# Patient Record
Sex: FEMALE | Race: WHITE | Employment: UNEMPLOYED | URBAN - METROPOLITAN AREA
[De-identification: names, ages, dates, MRNs, and addresses within clinical notes are randomized per-mention and may not be internally consistent; named-entity substitution may affect disease eponyms.]

---

## 2020-09-24 LAB
IRON: 73
SARS-COV-2: NOT DETECTED
TOTAL IRON BINDING CAPACITY: NORMAL

## 2021-01-04 ENCOUNTER — TELEPHONE (OUTPATIENT)
Dept: PRIMARY CARE CLINIC | Age: 21
End: 2021-01-04

## 2021-01-04 ENCOUNTER — OFFICE VISIT (OUTPATIENT)
Dept: PRIMARY CARE CLINIC | Age: 21
End: 2021-01-04
Payer: COMMERCIAL

## 2021-01-04 VITALS
DIASTOLIC BLOOD PRESSURE: 78 MMHG | BODY MASS INDEX: 20.73 KG/M2 | HEIGHT: 70 IN | WEIGHT: 144.8 LBS | TEMPERATURE: 97.2 F | OXYGEN SATURATION: 97 % | SYSTOLIC BLOOD PRESSURE: 116 MMHG | HEART RATE: 55 BPM

## 2021-01-04 DIAGNOSIS — M79.675 PAIN IN TOES OF BOTH FEET: ICD-10-CM

## 2021-01-04 DIAGNOSIS — M54.9 UPPER BACK PAIN: ICD-10-CM

## 2021-01-04 DIAGNOSIS — G47.00 INSOMNIA, UNSPECIFIED TYPE: ICD-10-CM

## 2021-01-04 DIAGNOSIS — B35.1 ONYCHOMYCOSIS: ICD-10-CM

## 2021-01-04 DIAGNOSIS — L70.0 ACNE VULGARIS: ICD-10-CM

## 2021-01-04 DIAGNOSIS — M79.674 PAIN IN TOES OF BOTH FEET: ICD-10-CM

## 2021-01-04 DIAGNOSIS — F84.0 AUTISM SPECTRUM DISORDER: ICD-10-CM

## 2021-01-04 DIAGNOSIS — M25.552 CHRONIC LEFT HIP PAIN: ICD-10-CM

## 2021-01-04 DIAGNOSIS — L70.0 ACNE VULGARIS: Primary | ICD-10-CM

## 2021-01-04 DIAGNOSIS — G89.29 CHRONIC LEFT HIP PAIN: ICD-10-CM

## 2021-01-04 PROCEDURE — 99204 OFFICE O/P NEW MOD 45 MIN: CPT | Performed by: FAMILY MEDICINE

## 2021-01-04 RX ORDER — NAPROXEN 500 MG/1
500 TABLET ORAL 2 TIMES DAILY WITH MEALS
Qty: 30 TABLET | Refills: 0 | Status: SHIPPED | OUTPATIENT
Start: 2021-01-04 | End: 2021-07-15 | Stop reason: SDUPTHER

## 2021-01-04 RX ORDER — CYCLOBENZAPRINE HCL 5 MG
5 TABLET ORAL NIGHTLY PRN
Qty: 30 TABLET | Refills: 0 | Status: SHIPPED | OUTPATIENT
Start: 2021-01-04 | End: 2021-01-28

## 2021-01-04 RX ORDER — NORGESTIMATE AND ETHINYL ESTRADIOL 7DAYSX3 28
1 KIT ORAL DAILY
COMMUNITY
End: 2021-07-20 | Stop reason: SDUPTHER

## 2021-01-04 RX ORDER — ZOLPIDEM TARTRATE 10 MG/1
10 TABLET ORAL NIGHTLY PRN
Qty: 30 TABLET | Refills: 0 | Status: SHIPPED | OUTPATIENT
Start: 2021-01-04 | End: 2021-01-04 | Stop reason: SDUPTHER

## 2021-01-04 RX ORDER — CLINDAMYCIN AND BENZOYL PEROXIDE 10; 50 MG/G; MG/G
GEL TOPICAL
Qty: 35 G | Refills: 3 | Status: SHIPPED | OUTPATIENT
Start: 2021-01-04 | End: 2021-01-04 | Stop reason: SDUPTHER

## 2021-01-04 RX ORDER — CYCLOBENZAPRINE HCL 5 MG
5 TABLET ORAL NIGHTLY PRN
Qty: 30 TABLET | Refills: 0 | Status: SHIPPED | OUTPATIENT
Start: 2021-01-04 | End: 2021-01-04 | Stop reason: SDUPTHER

## 2021-01-04 RX ORDER — CLINDAMYCIN AND BENZOYL PEROXIDE 10; 50 MG/G; MG/G
GEL TOPICAL
Qty: 35 G | Refills: 3 | Status: ON HOLD | OUTPATIENT
Start: 2021-01-04 | End: 2021-05-07 | Stop reason: ALTCHOICE

## 2021-01-04 RX ORDER — NAPROXEN 500 MG/1
500 TABLET ORAL 2 TIMES DAILY WITH MEALS
Qty: 30 TABLET | Refills: 0 | Status: SHIPPED | OUTPATIENT
Start: 2021-01-04 | End: 2021-01-04 | Stop reason: SDUPTHER

## 2021-01-04 RX ORDER — ZOLPIDEM TARTRATE 10 MG/1
10 TABLET ORAL NIGHTLY PRN
Qty: 30 TABLET | Refills: 0 | Status: SHIPPED | OUTPATIENT
Start: 2021-01-04 | End: 2021-02-01 | Stop reason: SDUPTHER

## 2021-01-04 ASSESSMENT — ENCOUNTER SYMPTOMS
NAUSEA: 0
ROS SKIN COMMENTS: ACNE ON CHIN
SHORTNESS OF BREATH: 0
VOMITING: 0
BACK PAIN: 1

## 2021-01-04 ASSESSMENT — PATIENT HEALTH QUESTIONNAIRE - PHQ9
2. FEELING DOWN, DEPRESSED OR HOPELESS: 0
SUM OF ALL RESPONSES TO PHQ QUESTIONS 1-9: 0
SUM OF ALL RESPONSES TO PHQ9 QUESTIONS 1 & 2: 0

## 2021-01-04 NOTE — TELEPHONE ENCOUNTER
Mother called in stating that she went to [facility] and they will not fill the meds rx'ed today because they do not fill [facility] insurance   Pt mom would like meds sent to [facility].

## 2021-01-04 NOTE — PROGRESS NOTES
[facility] PRIMARY CARE  [address]   [address]  [address] [address]  Dept: [phone]  Dept Fax: [phone]    Elena Rodriguez is a 21 y.o. female who presents today for her medical conditions/complaints as noted below. Elena Rodriguez is c/o of  Chief Complaint   Patient presents with    Establish Care    Medication Refill    Other     toe pain from toenail removal         HPI:     HPI     Here to establish care with new pcp    [place] [facility] [place]. Has hx of autism spectrum . She takes trisprintec regulary, helps regulate her periods . Her periods were long and very heavy and painful previously before being on OCP. She notes some acne on her face more so lately, mainly on her chin. Uses a face wash , but not topical medication at this point yet. She had ingrown toenail removal done previously and notes some yellow color to the medial side of her left foot and also slight discoloration of medial part of her R big toe. Feels like they burn. No swelling or redness of toes noted. Pt also deals with insomnia regularly. She was prescribed ambien 5mg which did not help but 10 mg did help her sleep better. Pt also has hx of chronic left hip pain and also notes some upper back pain. States the upper back pain started a week ago and states she usually lays a certain way on her side and feels may have caused some of this pain. States she felt a cracking sound all of a sudden and has been hurting since. No results found for: LABA1C          ( goal A1C is < 7)   No results found for: LABMICR  No results found for: LDLCHOLESTEROL, LDLCALC    (goal LDL is <100)   No results found for: AST, ALT, BUN  BP Readings from Last 3 Encounters:   01/04/21 116/78          (goal 120/80)    Past Medical History:   Diagnosis Date    Autism       History reviewed. No pertinent surgical history.     Family History   Problem Relation Age of Onset    Eyes:      Conjunctiva/sclera: Conjunctivae normal.      Pupils: Pupils are equal, round, and reactive to light. Neck:      Musculoskeletal: Neck supple. Cardiovascular:      Rate and Rhythm: Normal rate and regular rhythm. Heart sounds: Normal heart sounds. Pulmonary:      Effort: Pulmonary effort is normal.      Breath sounds: Normal breath sounds. Musculoskeletal:      Comments: Pt denies any ttp paraspinal muscles of upper back, states is sore but does note feel worse on palpation. Skin:     General: Skin is warm and dry. Comments: Acne on chin noted  Some yellow discoloration of left big toe medially. Neurological:      Mental Status: She is alert and oriented to person, place, and time. Psychiatric:         Behavior: Behavior normal.         Thought Content: Thought content normal.       /78   Pulse 55   Temp 97.2 °F (36.2 °C)   Ht 5' 10\" (1.778 m)   Wt 144 lb 12.8 oz (65.7 kg)   SpO2 97%   BMI 20.78 kg/m²     Assessment:      1. Acne vulgaris  - recommend benzaclin, rx sent to pharmacy . 2. Pain in toes of both feet  - pt had recent ingrown toenails removed before moving here. - recommend topical antifungal cream , some yellow color to left big toe. - recommend seeing podiatry. - Dieudonne Serrano DPM, Podiatry, [place]    3. Onychomycosis  - [facility], Rissa Paredes, TRACI, [address] [place] [address]    4. Upper back pain  - recommend PT, pt tearful and wants to find out what is going on. I recommended we can get xray and start physical theraoy. Pt hesitant to take medication due to side effects but recommend trial of naproxen and flexeril for short term for her back pain. - XR THORACIC SPINE (3 VIEWS); Future  - [facility] Physical Therapy - [place]    5. Insomnia, unspecified type  - pt states ambien 10 mg was helpful, discussed considering trazodone in future as well instead. 6. Chronic left hip pain  - [facility] Physical Therapy - [place]    7. Autism spectrum disorder             Plan:      Return in about 3 months (around 4/4/2021) for follow up. Orders Placed This Encounter   Procedures    XR THORACIC SPINE (3 VIEWS)     Standing Status:   Future     Standing Expiration Date:   1/4/2022     Order Specific Question:   Reason for exam:     Answer:   upper back pain, flared up past week. [address] [facility] TRACI Huber, Podiatry, [facility]     Referral Priority:   Routine     Referral Type:   Eval and Treat     Referral Reason:   Specialty Services Required     Referred to Provider:   Selene Bowman DPM     Requested Specialty:   Podiatry     Number of Visits Requested:   1    [facility] Physical Therapy - [place]     Referral Priority:   Routine     Referral Type:   Eval and Treat     Referral Reason:   Specialty Services Required     Requested Specialty:   Physical Therapy     Number of Visits Requested:   1     Orders Placed This Encounter   Medications    DISCONTD: clindamycin-benzoyl peroxide (BENZACLIN) 1-5 % gel     Sig: Apply topically 2 times daily. Dispense:  35 g     Refill:  3    DISCONTD: zolpidem (AMBIEN) 10 MG tablet     Sig: Take 1 tablet by mouth nightly as needed for Sleep for up to 30 days. Dispense:  30 tablet     Refill:  0    DISCONTD: cyclobenzaprine (FLEXERIL) 5 MG tablet     Sig: Take 1 tablet by mouth nightly as needed for Muscle spasms     Dispense:  30 tablet     Refill:  0    DISCONTD: naproxen (NAPROSYN) 500 MG tablet     Sig: Take 1 tablet by mouth 2 times daily (with meals) As needed for pain     Dispense:  30 tablet     Refill:  0        Patient given educational materials - see patient instructions. Discussed use, benefit, and side effects of prescribed medications. All patient questions answered. Pt voiced understanding. Reviewed healthmaintenance. Instructed to continue current medications, diet and exercise. Patient agreed with treatment plan. Follow up as directed.      Electronically signed by Jayla Mendoza DO Robert on 1/4/2021 at 1:22 PM

## 2021-01-07 ENCOUNTER — OFFICE VISIT (OUTPATIENT)
Dept: PODIATRY | Age: 21
End: 2021-01-07
Payer: COMMERCIAL

## 2021-01-07 VITALS — HEIGHT: 70 IN | WEIGHT: 144 LBS | TEMPERATURE: 97.2 F | BODY MASS INDEX: 20.62 KG/M2

## 2021-01-07 DIAGNOSIS — B35.1 DERMATOPHYTOSIS OF NAIL: Primary | ICD-10-CM

## 2021-01-07 PROCEDURE — G8420 CALC BMI NORM PARAMETERS: HCPCS | Performed by: PODIATRIST

## 2021-01-07 PROCEDURE — 1036F TOBACCO NON-USER: CPT | Performed by: PODIATRIST

## 2021-01-07 PROCEDURE — G8427 DOCREV CUR MEDS BY ELIG CLIN: HCPCS | Performed by: PODIATRIST

## 2021-01-07 PROCEDURE — G8484 FLU IMMUNIZE NO ADMIN: HCPCS | Performed by: PODIATRIST

## 2021-01-07 PROCEDURE — 99203 OFFICE O/P NEW LOW 30 MIN: CPT | Performed by: PODIATRIST

## 2021-01-07 RX ORDER — TERBINAFINE HYDROCHLORIDE 250 MG/1
250 TABLET ORAL DAILY
Qty: 30 TABLET | Refills: 0 | Status: SHIPPED | OUTPATIENT
Start: 2021-01-07 | End: 2021-02-04 | Stop reason: SDUPTHER

## 2021-01-07 NOTE — PROGRESS NOTES
[address] [facility] [address] [address] [facility] [address].  Dept: [phone]    NEW PATIENT PROGRESS NOTE  Date of patient's visit: 1/7/2021  Patient's Name:  Shelbi Escobar YOB: 2000            Patient Care Team:  Marisela Orozco DO as PCP - General (Family Medicine)  Marisela Orozco DO as PCP - [facility] Empaneled Provider        Chief Complaint   Patient presents with    New Patient    Nail Problem     fungus         HPI:   Shelbi Escobar is a 21 y.o. female who presents to the office today complaining of  Possible toenail fungus. Symptoms began 3 month(s) ago. Patient relates pain is Absent . Pain is rated 0 out of 10 and is described as none. Treatments prior to today's visit include: none. Currently denies F/C/N/V. Pt's primary care physician is Marisela Orozco DO last seen January 4 2021 patient states she had ingrown toenail removed in October and states they healed well. She states she thinks she has a fungus now due to the ingrown toenail removal and soakings. No Known Allergies    Past Medical History:   Diagnosis Date    Autism        Prior to Admission medications    Medication Sig Start Date End Date Taking? Authorizing Provider   clindamycin-benzoyl peroxide (BENZACLIN) 1-5 % gel Apply topically 2 times daily. 1/4/21  Yes Rehana Medhkour, DO   cyclobenzaprine (FLEXERIL) 5 MG tablet Take 1 tablet by mouth nightly as needed for Muscle spasms 1/4/21 1/14/21 Yes Rehana Medhkour, DO   naproxen (NAPROSYN) 500 MG tablet Take 1 tablet by mouth 2 times daily (with meals) As needed for pain 1/4/21  Yes Rehana Medhkour, DO   zolpidem (AMBIEN) 10 MG tablet Take 1 tablet by mouth nightly as needed for Sleep for up to 30 days. 1/4/21 2/3/21 Yes Rehana Medhkour, DO   Norgestim-Eth Estrad Triphasic (TRI-SPRINTEC) 0.18/0.215/0.25 MG-35 MCG TABS Take 1 tablet by mouth daily   Yes Historical Provider, MD       No past surgical history on file.     Family History   Problem Relation Age of Onset    Graves Disease Mother        Social History     Tobacco Use    Smoking status: Never Smoker    Smokeless tobacco: Never Used   Substance Use Topics    Alcohol use: Never     Frequency: Never       Review of Systems    Review of Systems:   History obtained from chart review and the patient  General ROS: negative for - chills, fatigue, fever, night sweats or weight gain  Constitutional: Negative for chills, diaphoresis, fatigue, fever and unexpected weight change. Musculoskeletal: Positive for arthralgias, gait problem and joint swelling. Neurological ROS: negative for - behavioral changes, confusion, headaches or seizures. Negative for weakness and numbness. Dermatological ROS: negative for - mole changes, rash  Cardiovascular: Negative for leg swelling. Gastrointestinal: Negative for constipation, diarrhea, nausea and vomiting. Lower Extremity Physical Examination:   Vitals: There were no vitals filed for this visit. General: AAO x 3 in NAD. Dermatologic Exam:  jomar hallux nails are thickened incurvated dystrophic and discolored. Musculoskeletal:     1st MPJ ROM decreased, Bilateral.  Muscle strength 5/5, Bilateral.  Medial longitudinal arch, Bilateral WNL.   Ankle ROM WNL,Bilateral.    Dorsally contracted digits absent digits 1-5 Bilateral.     Vascular: DP and PT pulses palpable 2/4, Bilateral.  CFT <3 seconds, Bilateral.  Hair growth present to the level of the digits, Bilateral.  Edema absent, Bilateral.  Varicosities absent, Bilateral. Erythema absent, Bilateral    Neurological: Sensation intact to light touch to level of digits, Bilateral.  Protective sensation intact 10/10 sites via 5.07/10g [place]-Dom Monofilament, Bilateral.  negative Tinel's, Bilateral.  negative Valleix sign, Bilateral.      Integument: Warm, dry, supple, Bilateral.  Open lesion absent, Bilateral.  Interdigital maceration absent to web spaces 1-4, Bilateral. Fissures absent, Bilateral.       Asessment: Patient is a 21 y.o. female with:    Diagnosis Orders   1. Dermatophytosis of nail  Hepatic Function Panel         Plan: Patient examined and evaluated. Current condition and treatment options discussed in detail. Discussed conservative and surgical options with the patient. Advised pt to get LFTs if starting oral lamisil therapy. Risks and benefits explained to the patient regarding oral vs topical antifungal therapy. She would like to start oral lamisil. .  Verbal and written instructions given to patient. Contact office with any questions/problems/concerns. RTC in 2month(s).     1/7/2021    Electronically signed by Melchor Vera DPM on 1/7/2021 at 3:06 PM  1/7/2021

## 2021-01-11 ENCOUNTER — HOSPITAL ENCOUNTER (OUTPATIENT)
Dept: GENERAL RADIOLOGY | Age: 21
Discharge: HOME | End: 2021-01-13
Payer: COMMERCIAL

## 2021-01-11 ENCOUNTER — HOSPITAL ENCOUNTER (OUTPATIENT)
Dept: PHYSICAL THERAPY | Facility: CLINIC | Age: 21
Setting detail: THERAPIES SERIES
Discharge: HOME | End: 2021-01-11
Payer: COMMERCIAL

## 2021-01-11 ENCOUNTER — HOSPITAL ENCOUNTER (OUTPATIENT)
Age: 21
Discharge: HOME | End: 2021-01-13
Payer: COMMERCIAL

## 2021-01-11 DIAGNOSIS — M54.9 UPPER BACK PAIN: ICD-10-CM

## 2021-01-11 PROCEDURE — 72072 X-RAY EXAM THORAC SPINE 3VWS: CPT

## 2021-01-18 ENCOUNTER — HOSPITAL ENCOUNTER (OUTPATIENT)
Dept: PHYSICAL THERAPY | Facility: CLINIC | Age: 21
Setting detail: THERAPIES SERIES
Discharge: HOME | End: 2021-01-18
Payer: COMMERCIAL

## 2021-01-18 PROCEDURE — 97162 PT EVAL MOD COMPLEX 30 MIN: CPT

## 2021-01-18 NOTE — FLOWSHEET NOTE
[x] [facility]. [facility]      for Health Promotion    [address]     Phone: [phone]     Fax:  [phone]     Physical Therapy Evaluation    Date:  2021  Patient: Tin Su  : 2000  MRN: [id number]  Physician: [address]: Sherry Midnight              Eligibility Status:  Eligible     Secondary Insurance (if applicable)               Eligibility Status: n/a  DOS:   # of visits allowed/remaining: unlimited  Source: Phone  Spoke Isatu Cottrell  Reference: [id number]  Medical Diagnosis: L upper back, L hip pain    Rehab Codes: M54.9, M25.552  Onset Date:                                    Subjective:  Pt w/ hx of Autism who reports pain of L mid/lower scap, upper back region, episodes of spasms w/ difficulty reaching. Pt states her shoulder blade feels like it is coming out. Pt notes long hx of upper back pain, had ~100 lb weight loss several years ago which she feels caused her to lose a lot of her upper body strength. Pain became progressively worse after episode of pain/spasm 3-4 wks ago but does not recall specific injury. XR neg. Pt also notes experiencing L hip pain, episodes of subluxation dating back to injury as a child when she slipped on floor, inadvertently ended up in splits. XR neg. Pt presents w/ mother who is her primay care giver to learn exercises to improve her strength and mobility.     PMHx: [] Unremarkable [] Diabetes [] HTN  [] Pacemaker   [] MI/Heart Problems [] Cancer [] Arthritis [] Asthma                         [x] refer to full medical chart  In [facility]  [] Other:        Tests: [x] X-Ray: [] MRI:  [] Other:    Medications: [x] Refer to full medical record [] None [] Other:  Allergies:      [x] Refer to full medical record [] None [] Other:    Function:  Hand Dominance  [x] Right  [] Left  Working:  [] Normal Duty  [] Light Duty  [] Off D/T Condition  [] Retired     [x] Not Employed  []  Disability  [] Other:            Return to program  Method of Education: [x] Verbal  [x] Demo  [x] Written  Comprehension of Education:  [x] Verbalizes understanding. [x] Demonstrates understanding. [] Needs Review. [] Demonstrates/verbalizes understanding of HEP/Ed previously given. Treatment Plan:  [x] Therapeutic Exercise    [] Modalities:  [] Therapeutic Activity    [] Ultrasound  [] Electrical Stimulation  [] Gait Training     [] Massage       [] Lumbar/Cervical Traction  [] Neuromuscular Re-education [x] Cold/hotpack [] Instruction in HEP  [] Manual Therapy   [] Aquatic Therapy [] Other:     [] Iontophoresis: 4 mg/mL Dexamethasone Sodium Phosphate 40-80 mAmin  [] Drug allergies reviewed    _______Initials           _______Date     Frequency:  1-2 x/week for 12 visits    Treatment Charges: Mins Units   [x] Evaluation ----- 1   []  Modalities     []  Ther Exercise     []  Manual Therapy     []  Ther Activities     []  Aquatics     []  Other       Time in: 1500     Time out: 1600    Electronically signed by: Radha Aguayo PT        Physician Signature:________________________________Date:__________________  By signing above, I have reviewed this plan of care and certify a need for medically   necessary rehabilitation services.      *PLEASE SIGN ABOVE AND FAX BACK ALL PAGES*

## 2021-01-25 ENCOUNTER — HOSPITAL ENCOUNTER (OUTPATIENT)
Dept: PHYSICAL THERAPY | Facility: CLINIC | Age: 21
Setting detail: THERAPIES SERIES
Discharge: HOME | End: 2021-01-25
Payer: COMMERCIAL

## 2021-01-25 PROCEDURE — 97110 THERAPEUTIC EXERCISES: CPT

## 2021-01-25 NOTE — FLOWSHEET NOTE
[x] [facility]. [address] [facility] Health Promotion    [address]     Phone: [phone]     Fax:  [phone]     Physical Therapy Daily Treatment Note     Date:  2021  Patient: Swapnil Marinelli  : 2000  MRN: [id number]  Physician: [address]: [facility]              Eligibility Status:  Eligible     Secondary Insurance (if applicable)               Eligibility Status: n/a  DOS:   # of visits allowed/remaining: unlimited  Source: Phone  Spoke Mary Rivero  Reference: [id number]  Medical Diagnosis: L upper back, L hip pain    Rehab Codes: M54.9, M25.552  Onset Date:                                    Subjective:  Pt w/ hx of Autism who presents w/ her mother reporting cont pain of L mid/lower scap, upper back region, L ant hip region. She had initial soreness after evaluation last week, but resolved shortly. Pain:  [x] Yes  [] No Pain Rating: (0-10 scale) 5/10  Pain altered Tx:  [] Yes  [x] No  Action:  Comments:    Objective: Todays Treatment: 2021 Visit #2    Exercise Reps/ Time Weight/ Level Comments   Airdyne 5 min     Doorway stretch 5x10s     Supine stick flex 5x10s     Prone quad w/ strap 5x10s     Supine glu med stretch 5x10s       Specific Instructions for next treatment:    Treatment Charges: Mins Units   []  Modalities     [x]  Ther Exercise 35 2   []  Manual Therapy     []  Ther Activities     []  Aquatics     []  Other       Assessment: [x] Progressing toward goals. [] No change. [x] Other: Pt needs constant supervision, instruction, reassurance during exercises. Tendency to be guarded to movement as she has a difficult time discerning between therapeutic movement vs potentially harmful movement. Pt many times will withdraw from movement d/t fear of shldr blade or hip joint coming out of place as soon as detects any kind of movement, even when it is protected or supported.  Will start w/ low volume of exercises to enable to her to gain confidence and not feel overwhelmed. Gradually add exercises as competency improves and pain/gurading response diminishes. Pt. Education:  [x] Yes  [] No  [x] Reviewed Prior HEP/Ed  Method of Education: [x] Verbal  [x] Demo  [x] Written  Comprehension of Education:  [x] Verbalizes understanding. [x] Demonstrates understanding. [] Needs review. [x] Demonstrates/verbalizes HEP/Ed previously given. Plan: [x] Continue per plan of care.    [] Other:      Time In:1730            Time Out: 1062    Electronically signed by:  Jamari Guerra, PT

## 2021-01-28 DIAGNOSIS — G47.00 INSOMNIA, UNSPECIFIED TYPE: ICD-10-CM

## 2021-01-28 RX ORDER — ZOLPIDEM TARTRATE 10 MG/1
TABLET ORAL
Qty: 30 TABLET | OUTPATIENT
Start: 2021-01-28

## 2021-01-28 RX ORDER — CYCLOBENZAPRINE HCL 5 MG
TABLET ORAL
Qty: 30 TABLET | Refills: 0 | Status: SHIPPED | OUTPATIENT
Start: 2021-01-28 | End: 2021-02-22 | Stop reason: SDUPTHER

## 2021-02-01 ENCOUNTER — HOSPITAL ENCOUNTER (OUTPATIENT)
Dept: PHYSICAL THERAPY | Facility: CLINIC | Age: 21
Setting detail: THERAPIES SERIES
Discharge: HOME | End: 2021-02-01
Payer: COMMERCIAL

## 2021-02-01 DIAGNOSIS — G47.00 INSOMNIA, UNSPECIFIED TYPE: ICD-10-CM

## 2021-02-01 PROCEDURE — 97110 THERAPEUTIC EXERCISES: CPT

## 2021-02-01 RX ORDER — ZOLPIDEM TARTRATE 10 MG/1
10 TABLET ORAL NIGHTLY PRN
Qty: 30 TABLET | Refills: 0 | Status: SHIPPED | OUTPATIENT
Start: 2021-02-01 | End: 2021-03-01 | Stop reason: SDUPTHER

## 2021-02-01 NOTE — FLOWSHEET NOTE
[x] [facility]. [address] [place] [address] [employer] Promotion    [address]     Phone: [phone]     Fax:  [phone]     Physical Therapy Daily Treatment Note     Date:  2021  Patient: Trena Alexis  : 2000  MRN: [id number]  Physician: [address]: Rody Daily              Eligibility Status:  Eligible     Secondary Insurance (if applicable)               Eligibility Status: n/a  DOS:   # of visits allowed/remaining: unlimited  Source: Phone  Spoke Pacheco Middleton  Reference: [id number]  Medical Diagnosis: L upper back, L hip pain    Rehab Codes: M54.9, M25.552  Onset Date:                                    Subjective:  Pt w/ hx of Autism who presents w/ her mother reporting working on her exercises consistently, states that they make her shldr and hip sore, cont to be apprehensive of basic motions d/t fear of shldr blade and hip getting stuck. Pain:  [x] Yes  [] No Pain Rating: (0-10 scale) 5/10  Pain altered Tx:  [] Yes  [x] No  Action:  Comments:    Objective: Todays Treatment: 2021 Visit #3    Exercise Reps/ Time Weight/ Level Comments   Airdyne 5 min     Doorway stretch 5x10s     Supine stick flex 5x10s     Prone quad w/ strap 5x10s     Supine glut med stretch 5x10s     Tband bridges 1x10 ylw    Tband clams 1x10 ylw    Tband shldr ext 1x10 org    Tband rows 1x10 org      Specific Instructions for next treatment:    Treatment Charges: Mins Units   []  Modalities     [x]  Ther Exercise 35 2   []  Manual Therapy     []  Ther Activities     []  Aquatics     []  Other       Assessment: [x] Progressing toward goals. [] No change. [x] Other: Pt needs constant supervision, instruction, reassurance during exercises. Demonstrates ability to gain a comfortable stretch position, but then becomes guarded as she tries to return to starting position as she fears the shldr or hip locking up.  Noted similar movement patterns on resistance exercises w/ progression of

## 2021-02-01 NOTE — TELEPHONE ENCOUNTER
Last OV 01/04/2021    Next OV 04/05/2021    Health Maintenance   Topic Date Due    Hepatitis C screen  2000    DTaP/Tdap/Td vaccine (6 - Tdap) 07/31/2011    HIV screen  07/31/2015    Chlamydia screen  07/31/2016    Flu vaccine (1) 09/01/2020    Hepatitis A vaccine  Completed    Hib vaccine  Completed    HPV vaccine  Completed    Varicella vaccine  Completed    Meningococcal (ACWY) vaccine  Completed    Hepatitis B vaccine  Aged Out    Pneumococcal 0-64 years Vaccine  Aged Out             (applicable per patient's age: Cancer Screenings, Depression Screening, Fall Risk Screening, Immunizations)    No results found for: LABA1C, LABMICR, LDLCHOLESTEROL, LDLCALC, AST, ALT, BUN   (goal A1C is < 7)   (goal LDL is <100) need 30-50% reduction from baseline     BP Readings from Last 3 Encounters:   01/04/21 116/78    (goal /80)      All Future Testing planned in [facility]:  Lab Frequency Next Occurrence   Hepatic Function Panel Once 01/21/2021       Next Visit Date:  Future Appointments   Date Time Provider Elizabeth Flores   2/1/2021  4:00 PM Sarah Beth Taveras,  [facility]   4/5/2021  1:00 PM DO [facility]   4/8/2021  3:30 PM TRACI Jackman POD[facility]AT [facility]            Patient Active Problem List:     Autism spectrum disorder     Insomnia

## 2021-02-03 ENCOUNTER — HOSPITAL ENCOUNTER (OUTPATIENT)
Age: 21
Discharge: HOME | End: 2021-02-03
Payer: COMMERCIAL

## 2021-02-03 DIAGNOSIS — B35.1 DERMATOPHYTOSIS OF NAIL: ICD-10-CM

## 2021-02-03 PROCEDURE — 36415 COLL VENOUS BLD VENIPUNCTURE: CPT

## 2021-02-03 PROCEDURE — 80076 HEPATIC FUNCTION PANEL: CPT

## 2021-02-04 LAB
ALBUMIN SERPL-MCNC: 4.3 G/DL (ref 3.5–5.2)
ALBUMIN/GLOBULIN RATIO: 1.5 (ref 1–2.5)
ALP BLD-CCNC: 55 U/L (ref 35–104)
ALT SERPL-CCNC: 14 U/L (ref 5–33)
AST SERPL-CCNC: 13 U/L
BILIRUB SERPL-MCNC: 0.37 MG/DL (ref 0.3–1.2)
BILIRUBIN DIRECT: 0.11 MG/DL
BILIRUBIN, INDIRECT: 0.26 MG/DL (ref 0–1)
GLOBULIN: NORMAL G/DL (ref 1.5–3.8)
TOTAL PROTEIN: 7.1 G/DL (ref 6.4–8.3)

## 2021-02-04 RX ORDER — TERBINAFINE HYDROCHLORIDE 250 MG/1
250 TABLET ORAL DAILY
Qty: 30 TABLET | Refills: 0 | Status: SHIPPED | OUTPATIENT
Start: 2021-02-04 | End: 2021-03-01

## 2021-02-08 ENCOUNTER — HOSPITAL ENCOUNTER (OUTPATIENT)
Dept: PHYSICAL THERAPY | Facility: CLINIC | Age: 21
Setting detail: THERAPIES SERIES
Discharge: HOME | End: 2021-02-08
Payer: COMMERCIAL

## 2021-02-08 PROCEDURE — 97110 THERAPEUTIC EXERCISES: CPT

## 2021-02-08 NOTE — FLOWSHEET NOTE
of Education: [x] Verbal  [x] Demo  [x] Written  Comprehension of Education:  [x] Verbalizes understanding. [x] Demonstrates understanding. [] Needs review. [x] Demonstrates/verbalizes HEP/Ed previously given. Plan: [x] Continue per plan of care.    [] Other:      Time In:1730            Time Out: 5128    Electronically signed by:  Candice Sahni PT

## 2021-02-15 ENCOUNTER — HOSPITAL ENCOUNTER (OUTPATIENT)
Dept: PHYSICAL THERAPY | Facility: CLINIC | Age: 21
Setting detail: THERAPIES SERIES
Discharge: HOME | End: 2021-02-15
Payer: COMMERCIAL

## 2021-02-15 NOTE — FLOWSHEET NOTE
[x] [address]   Outpatient Rehabilitation &  Therapy  [address]  P: [phone]  F: [phone]     Physical Therapy Cancel/No Show note    Date: 2/15/2021  Patient: Shelbi Escobar  : 2000  MRN: [id number]    Cancels/No Shows to date: 1    For today's appointment patient:    [x]  Cancelled    [] Rescheduled appointment    [] No-show     Reason given by patient:    []  Patient ill    []  Conflicting appointment    [] No transportation      [] Conflict with work    [] No reason given    [x] Weather related    [] COVID-19    [] Other:      Comments:        [x] Next appointment was confirmed    Electronically signed by: Dustin Schwab PTA

## 2021-02-22 ENCOUNTER — HOSPITAL ENCOUNTER (OUTPATIENT)
Dept: PHYSICAL THERAPY | Facility: CLINIC | Age: 21
Setting detail: THERAPIES SERIES
Discharge: HOME | End: 2021-02-22
Payer: COMMERCIAL

## 2021-02-22 DIAGNOSIS — G47.00 INSOMNIA, UNSPECIFIED TYPE: ICD-10-CM

## 2021-02-22 PROCEDURE — 97110 THERAPEUTIC EXERCISES: CPT

## 2021-02-22 RX ORDER — ZOLPIDEM TARTRATE 10 MG/1
TABLET ORAL
Qty: 30 TABLET | OUTPATIENT
Start: 2021-02-22

## 2021-02-22 NOTE — FLOWSHEET NOTE
[x] [facility]      for Health Promotion    [address]     Phone: [phone]     Fax:  [phone]     Physical Therapy Daily Treatment Note     Date:  2021  Patient: Christal Varghese  : 2000  MRN: [id number]  Physician: [address]: Valente Camryn              Eligibility Status:  Eligible     Secondary Insurance (if applicable)               Eligibility Status: n/a  DOS:   # of visits allowed/remaining: unlimited  Source: Phone  Spoke Leelee Ingram  Reference: [id number]  Medical Diagnosis: L upper back, L hip pain    Rehab Codes: M54.9, M25.552  Onset Date:                                    Subjective:  Pt w/ hx of Autism who presents w/ her mother reporting cont L shldr, hip pain, fear of joints coming out of place. Mom states that they had to back down resistance of exercises at home to help pt be more comfortable doing HEP     Pain:  [x] Yes  [] No Pain Rating: (0-10 scale) 5/10  Pain altered Tx:  [] Yes  [x] No  Action:  Comments:    Objective: Todays Treatment: 2021 Visit #5    Exercise Reps/ Time Weight/ Level Comments   Airdyne 5 min     Doorway stretch 5x10s     Supine stick flex 5x10s     Prone quad w/ strap 5x10s     Supine glut med stretch 5x10s     Tband bridges 2x10 ylw    Tband clams 2x10 ylw    Tband shldr ext 2x10 org    Tband rows 2x10 org      Specific Instructions for next treatment:    Treatment Charges: Mins Units   []  Modalities     [x]  Ther Exercise 35 2   []  Manual Therapy     []  Ther Activities     []  Aquatics     []  Other       Assessment: [x] Progressing toward goals. [] No change. [x] Other: Pt notes L lat hip, L post shldr pain w/ exercises, apprehension of instability which actually may be more manifesting as crepitus which pt is not able to discern the difference. Pt needs freq reassurance while doing exercises to complete within self selected range.       Pt. Education:  [x] Yes  [] No  [x] Reviewed Prior HEP/Ed  Method of Education: [x] Verbal  [x] Demo  [x] Written  Comprehension of Education:  [x] Verbalizes understanding. [x] Demonstrates understanding. [] Needs review. [x] Demonstrates/verbalizes HEP/Ed previously given. Plan: [x] Continue per plan of care.    [] Other:      Time In:1730            Time Out: 4329    Electronically signed by:  Clarice Tracy PT

## 2021-02-23 RX ORDER — CYCLOBENZAPRINE HCL 5 MG
TABLET ORAL
Qty: 30 TABLET | Refills: 0 | Status: SHIPPED | OUTPATIENT
Start: 2021-02-23 | End: 2021-03-15

## 2021-02-23 RX ORDER — ZOLPIDEM TARTRATE 10 MG/1
10 TABLET ORAL NIGHTLY PRN
Qty: 30 TABLET | Refills: 0 | OUTPATIENT
Start: 2021-02-23 | End: 2021-03-25

## 2021-03-01 ENCOUNTER — HOSPITAL ENCOUNTER (OUTPATIENT)
Dept: PHYSICAL THERAPY | Facility: CLINIC | Age: 21
Setting detail: THERAPIES SERIES
Discharge: HOME | End: 2021-03-01
Payer: COMMERCIAL

## 2021-03-01 DIAGNOSIS — G47.00 INSOMNIA, UNSPECIFIED TYPE: ICD-10-CM

## 2021-03-01 PROCEDURE — 97140 MANUAL THERAPY 1/> REGIONS: CPT

## 2021-03-01 PROCEDURE — 97110 THERAPEUTIC EXERCISES: CPT

## 2021-03-01 RX ORDER — TERBINAFINE HYDROCHLORIDE 250 MG/1
TABLET ORAL
Qty: 30 TABLET | Refills: 0 | Status: ON HOLD | OUTPATIENT
Start: 2021-03-01 | End: 2021-05-07

## 2021-03-01 RX ORDER — ZOLPIDEM TARTRATE 10 MG/1
10 TABLET ORAL NIGHTLY PRN
Qty: 30 TABLET | Refills: 0 | Status: SHIPPED | OUTPATIENT
Start: 2021-03-01 | End: 2021-03-29 | Stop reason: SDUPTHER

## 2021-03-01 NOTE — TELEPHONE ENCOUNTER
Last visit: 1/4/21    Next visit: 4/5/21    Health Maintenance   Topic Date Due    Hepatitis C screen  2000    DTaP/Tdap/Td vaccine (6 - Tdap) 07/31/2011    HIV screen  07/31/2015    Chlamydia screen  07/31/2016    Flu vaccine (1) 09/01/2020    Hepatitis A vaccine  Completed    Hib vaccine  Completed    HPV vaccine  Completed    Varicella vaccine  Completed    Meningococcal (ACWY) vaccine  Completed    Hepatitis B vaccine  Aged Out    Pneumococcal 0-64 years Vaccine  Aged Out             (applicable per patient's age: Cancer Screenings, Depression Screening, Fall Risk Screening, Immunizations)    AST (U/L)   Date Value   02/03/2021 13     ALT (U/L)   Date Value   02/03/2021 14      (goal A1C is < 7)   (goal LDL is <100) need 30-50% reduction from baseline     BP Readings from Last 3 Encounters:   01/04/21 116/78    (goal /80)      All Future Testing planned in [facility]:      Next Visit Date:  Future Appointments   Date Time Provider Elizabeth Flores   3/1/2021  4:00 PM [facility] 41 [facility]   4/5/2021  1:00 PM DO Ruby Young PC [facility]   4/8/2021  3:30 PM TRACI Rivas            Patient Active Problem List:     Autism spectrum disorder     Insomnia

## 2021-03-01 NOTE — FLOWSHEET NOTE
[x] Spike. [facility] Health Promotion    [address]     Phone: [phone]     Fax:  [phone]     Physical Therapy Daily Treatment Note     Date:  3/1/2021  Patient: Eugenie Morgan  : 2000  MRN: [id number]  Physician: [address]: [facility]              Eligibility Status:  Eligible     Secondary Insurance (if applicable)               Eligibility Status: n/a  DOS:   # of visits allowed/remaining: unlimited  Source: Phone  Spoke Monica Rodas  Reference: [id number]  Medical Diagnosis: L upper back, L hip pain    Rehab Codes: M54.9, M25.552  Onset Date:      Visit# / total visits:                               Cancels/No Shows: 0                                Subjective:   Reports pain \"all over. \" Hasn't been sleeping well d/t not having her sleeping pills refilled yet. Therefore has not felt able to complete her HEP regularly this week. Pain:  [x] Yes  [] No Pain Rating: (0-10 scale) 7/10  Pain altered Tx:  [] Yes  [x] No  Action:  Comments:    Objective: Todays Treatment: 3/1/2021 Visit #5    Exercise Reps/ Time Weight/ Level Comments   Airdyne 5 min     Doorway stretch 5x10s     Supine stick flex 5x10s     Prone quad w/ strap 5x10s     Supine glut med stretch 5x10s     Tband bridges 3x10 ylw    Tband clams 2x10 ylw    Tband shldr ext 3x10 org    Tband rows 3x10 org    Tband Biceps 2x10 org    Tband Triceps 3x10 org      Other: DI to L hip flexor     Specific Instructions for next treatment:    Treatment Charges: Mins Units   []  Modalities     [x]  Ther Exercise 35 2   [x]  Manual Therapy 10 1   []  Ther Activities     []  Aquatics     []  Other 45 3     Assessment: [x] Progressing toward goals. Added tband bicep and tricep to work on UE strength to improve overall function.  Needs frequent verbal/tactile cues, demonstration, and reassurance to complete program. Added L hip flexor release with good carry over and less \"catching\" noted post. Issued HEP of new exercises. Will be out of town next week, scheduled for when she returns. [] No change. [] Other:     SHORT TERM GOALS ( 8 visits)  Shldr, hip pain = 0  Shldr, hip ROM = WNL  Shldr, hip strength = 4+/5  Shldr, hip function: reach overhead, behind back, lift/carry, push/pull w/o pain     LONG TERM GOALS ( 12 visits)  Independent Home Exercise program  Return to normal activity  Pt. Education:  [x] Yes  [] No  [] Reviewed Prior HEP/Ed  Method of Education: [x] Verbal  [] Demo  [] Written  Comprehension of Education:  [x] Verbalizes understanding. [] Demonstrates understanding. [] Needs review. [] Demonstrates/verbalizes HEP/Ed previously given. Plan: [x] Continue per plan of care.     [] Other:     Time In:  4:00 pm       Time Out: 4:55 pm    Electronically signed by:  Shreyas Blank PTA

## 2021-03-15 ENCOUNTER — HOSPITAL ENCOUNTER (OUTPATIENT)
Dept: PHYSICAL THERAPY | Facility: CLINIC | Age: 21
Setting detail: THERAPIES SERIES
Discharge: HOME | End: 2021-03-15
Payer: COMMERCIAL

## 2021-03-15 PROCEDURE — 97110 THERAPEUTIC EXERCISES: CPT

## 2021-03-15 RX ORDER — CYCLOBENZAPRINE HCL 5 MG
TABLET ORAL
Qty: 30 TABLET | Refills: 0 | Status: SHIPPED | OUTPATIENT
Start: 2021-03-15 | End: 2021-03-28 | Stop reason: SDUPTHER

## 2021-03-15 NOTE — TELEPHONE ENCOUNTER
LOV 1/4/21  NOV 3/22/21    Health Maintenance   Topic Date Due    Hepatitis C screen  Never done    DTaP/Tdap/Td vaccine (6 - Tdap) 07/31/2011    HIV screen  Never done    Chlamydia screen  Never done    Flu vaccine (1) 09/01/2020    Hepatitis A vaccine  Completed    Hib vaccine  Completed    HPV vaccine  Completed    Varicella vaccine  Completed    Meningococcal (ACWY) vaccine  Completed    Hepatitis B vaccine  Aged Out    Pneumococcal 0-64 years Vaccine  Aged Out             (applicable per patient's age: Cancer Screenings, Depression Screening, Fall Risk Screening, Immunizations)    AST (U/L)   Date Value   02/03/2021 13     ALT (U/L)   Date Value   02/03/2021 14      (goal A1C is < 7)   (goal LDL is <100) need 30-50% reduction from baseline     BP Readings from Last 3 Encounters:   01/04/21 116/78    (goal /80)      All Future Testing planned in [facility]:      Next Visit Date:  Future Appointments   Date Time Provider Elizabeth Flores   3/22/2021  4:00 PM Joanie Fraga PT [facility] 41 [facility]   3/25/2021  3:30 PM Xi Durant DPM [facility]   4/5/2021  1:00 PM DO Ruby Waterman [facility]   4/8/2021  3:30 PM Xi Durant DPM [facility]            Patient Active Problem List:     Autism spectrum disorder     Insomnia

## 2021-03-15 NOTE — FLOWSHEET NOTE
[x] [facility]. Tanika Wilson      for Health Promotion    [address]     Phone: [phone]     Fax:  [phone]     Physical Therapy Daily Treatment Note     Date:  3/15/2021  Patient: Diallo Avalos  : 2000  MRN: [id number]  Physician: [address]: [facility]              Eligibility Status:  Eligible     Secondary Insurance (if applicable)               Eligibility Status: n/a  DOS:   # of visits allowed/remaining: unlimited  Source: Phone  Spoke Kamran Villasenor  Reference: [id number]  Medical Diagnosis: L upper back, L hip pain    Rehab Codes: M54.9, M25.552  Onset Date:      Visit# / total visits:                               Cancels/No Shows: 0                                Subjective:   Pt returns to PT after spending time down in [place] w/ grandparents, states he forgot her bands so was not able to do HEP, but notes she did a lot of walking. Pt reports L shldr, hip cont to be painful, but no episodes of instability while away. Pain:  [x] Yes  [] No Pain Rating: (0-10 scale) 7/10  Pain altered Tx:  [] Yes  [x] No  Action:  Comments:    Objective: Todays Treatment: 3/15/2021 Visit #5    Exercise Reps/ Time Weight/ Level Comments   Airdyne 5 min     Doorway stretch 5x10s     Supine stick flex 5x10s     Prone quad w/ strap 5x10s     Supine glut med stretch 5x10s     Tband bridges 3x10 ylw    Tband clams 2x10 ylw    Tband shldr ext 3x10 org    Tband rows 3x10 org    Tband Biceps 2x10 org    Tband Triceps 3x10 org      Other: DI to L hip flexor     Specific Instructions for next treatment:    Treatment Charges: Mins Units   []  Modalities     [x]  Ther Exercise 35 2   []  Manual Therapy     []  Ther Activities     []  Aquatics       Assessment: [x] Progressing toward goals. [] No change. [x] Other: Pt noted most pain, fatigue w/ hip exercises, struggled for even small amounts of ROM even when resistance removed.     SHORT TERM GOALS ( 8 visits)  Shldr, hip pain = 0  Shldr, hip ROM = WNL  Shldr, hip strength = 4+/5  Shldr, hip function: reach overhead, behind back, lift/carry, push/pull w/o pain     LONG TERM GOALS ( 12 visits)  Independent Home Exercise program  Return to normal activity  Pt. Education:  [x] Yes  [] No  [] Reviewed Prior HEP/Ed  Method of Education: [x] Verbal  [] Demo  [] Written  Comprehension of Education:  [x] Verbalizes understanding. [] Demonstrates understanding. [] Needs review. [] Demonstrates/verbalizes HEP/Ed previously given. Plan: [x] Continue per plan of care.     [] Other:     Time In:  4:00 pm       Time Out: 4:55 pm    Electronically signed by:  Roz Caraballo PT

## 2021-03-22 ENCOUNTER — HOSPITAL ENCOUNTER (OUTPATIENT)
Dept: PHYSICAL THERAPY | Facility: CLINIC | Age: 21
Setting detail: THERAPIES SERIES
Discharge: HOME | End: 2021-03-22
Payer: COMMERCIAL

## 2021-03-22 NOTE — FLOWSHEET NOTE
[] [address]  [address]    [phone]  F: [phone]   [] [address]  [facility] 36   [address]  P: [phone]  F: [phone]  [] [place]  [address] [facility]  P: [phone]  F: [phone] [] [address]  P: [phone]  F: [phone]  [] [address]  [address] [address] [facility] [address]   [address]   [place]: [phone]  F: [phone]   [] [address]  [place]: [phone]   F: [phone]     Physical Therapy Cancel/No Show note    Date: 3/22/2021  Patient: Christal Varghese  : 2000  MRN: [id number]    Cancels/No Shows to date: 3    For today's appointment patient:    [x]  Cancelled    [] Rescheduled appointment    [] No-show     Reason given by patient:    []  Patient ill    []  Conflicting appointment    [] No transportation      [] Conflict with work    [x] No reason given    [] Weather related    [] COVID-19    [] Other:      Comments:        [] Next appointment was confirmed    Electronically signed by: Sun Lora

## 2021-03-25 ENCOUNTER — OFFICE VISIT (OUTPATIENT)
Dept: PODIATRY | Age: 21
End: 2021-03-25
Payer: COMMERCIAL

## 2021-03-25 VITALS — WEIGHT: 144 LBS | BODY MASS INDEX: 20.62 KG/M2 | HEIGHT: 70 IN | TEMPERATURE: 97.3 F

## 2021-03-25 DIAGNOSIS — M79.674 PAIN IN TOES OF BOTH FEET: Primary | ICD-10-CM

## 2021-03-25 DIAGNOSIS — B35.1 DERMATOPHYTOSIS OF NAIL: ICD-10-CM

## 2021-03-25 DIAGNOSIS — L60.0 INGROWN NAIL: ICD-10-CM

## 2021-03-25 DIAGNOSIS — M79.675 PAIN IN TOES OF BOTH FEET: Primary | ICD-10-CM

## 2021-03-25 PROCEDURE — G8484 FLU IMMUNIZE NO ADMIN: HCPCS | Performed by: PODIATRIST

## 2021-03-25 PROCEDURE — 1036F TOBACCO NON-USER: CPT | Performed by: PODIATRIST

## 2021-03-25 PROCEDURE — G8420 CALC BMI NORM PARAMETERS: HCPCS | Performed by: PODIATRIST

## 2021-03-25 PROCEDURE — 99214 OFFICE O/P EST MOD 30 MIN: CPT | Performed by: PODIATRIST

## 2021-03-25 PROCEDURE — G8427 DOCREV CUR MEDS BY ELIG CLIN: HCPCS | Performed by: PODIATRIST

## 2021-03-25 RX ORDER — RISPERIDONE 1 MG/1
TABLET, FILM COATED ORAL
Status: ON HOLD | COMMUNITY
Start: 2021-03-03 | End: 2021-05-07

## 2021-03-25 RX ORDER — HYDROXYZINE PAMOATE 25 MG/1
CAPSULE ORAL
Status: ON HOLD | COMMUNITY
Start: 2021-02-11 | End: 2021-05-07

## 2021-03-29 ENCOUNTER — HOSPITAL ENCOUNTER (OUTPATIENT)
Dept: PHYSICAL THERAPY | Facility: CLINIC | Age: 21
Setting detail: THERAPIES SERIES
Discharge: HOME | End: 2021-03-29
Payer: COMMERCIAL

## 2021-03-29 DIAGNOSIS — G47.00 INSOMNIA, UNSPECIFIED TYPE: ICD-10-CM

## 2021-03-29 PROCEDURE — 97110 THERAPEUTIC EXERCISES: CPT

## 2021-03-29 RX ORDER — CYCLOBENZAPRINE HCL 5 MG
TABLET ORAL
Qty: 30 TABLET | Refills: 0 | Status: SHIPPED | OUTPATIENT
Start: 2021-03-29 | End: 2021-04-11

## 2021-03-29 NOTE — FLOWSHEET NOTE
[x] [facility]. Ron Terry      for Health Promotion    [address]     Phone: [phone]     Fax:  [phone]     Physical Therapy Daily Treatment Note     Date:  3/29/2021  Patient: Hiren Burden  : 2000  MRN: [id number]  Physician: [facility]: Lilian Comes              Eligibility Status:  Eligible     Secondary Insurance (if applicable)               Eligibility Status: n/a  DOS:   # of visits allowed/remaining: unlimited  Source: Phone  Spoke Madelyn Ha  Reference: [id number]  Medical Diagnosis: L upper back, L hip pain    Rehab Codes: M54.9, M25.552  Onset Date:      Visit# / total visits:                               Cancels/No Shows: 0                                Subjective:   Pt reports not being able to attend PT last week, mother notes pt has not been sleeping well, only about 1-2 hrs a night as they are having difficulties getting her meds corrected. Pt has not been able to work much on her HEP d/t cont L scapular, lat hip pain. Pt did go for a walk today and noted her legs are tired and sore. Pt did not want to work on leg exercises, but was willing to work with her arms. Pain:  [x] Yes  [] No Pain Rating: (0-10 scale) 7/10  Pain altered Tx:  [] Yes  [x] No  Action:  Comments:    Objective: Todays Treatment: 3/29/2021 Visit #6    Exercise Reps/ Time Weight/ Level Comments   UBE 5 min     Doorway stretch 5x10s     Supine stick flex 5x10s     Doorway horiz add stretch 5x10s     Supine SA punch 2x10 2#    SL ER 2x10 1#    Prone rows 2x10 1#    Tband shldr ext 2x10 ylw    Tband Biceps 2x10 ylw      Other: DI to L hip flexor     Specific Instructions for next treatment:    Treatment Charges: Mins Units   []  Modalities     [x]  Ther Exercise 35 2   []  Manual Therapy     []  Ther Activities     []  Aquatics       Assessment: [x] Progressing toward goals. [] No change.      [x] Other: Pt apprehensive of shldr motions within full range of exercises, needed light tactile cuing to guide motion in order to allow pt to gain confidence in motion and be less anxious about shoulder getting stuck. SHORT TERM GOALS ( 8 visits)  Shldr, hip pain = 0  Shldr, hip ROM = WNL  Shldr, hip strength = 4+/5  Shldr, hip function: reach overhead, behind back, lift/carry, push/pull w/o pain     LONG TERM GOALS ( 12 visits)  Independent Home Exercise program  Return to normal activity  Pt. Education:  [x] Yes  [] No  [] Reviewed Prior HEP/Ed  Method of Education: [x] Verbal  [] Demo  [] Written  Comprehension of Education:  [x] Verbalizes understanding. [] Demonstrates understanding. [] Needs review. [] Demonstrates/verbalizes HEP/Ed previously given. Plan: [x] Continue per plan of care.     [] Other:     Time In:  4:00 pm       Time Out: 4:55 pm    Electronically signed by:  Danna Bernheim, PT

## 2021-03-30 RX ORDER — ZOLPIDEM TARTRATE 10 MG/1
10 TABLET ORAL NIGHTLY PRN
Qty: 30 TABLET | Refills: 0 | Status: SHIPPED | OUTPATIENT
Start: 2021-03-30 | End: 2021-05-05 | Stop reason: SDUPTHER

## 2021-04-01 NOTE — PROGRESS NOTES
[address] [facility] [address] [address] [facility].  Dept: [phone]    RETURN PATIENT PROGRESS NOTE  Date of patient's visit: 4/1/2021  Patient's Name:  Randee Han YOB: 2000            Patient Care Team:  Mariah Chavira DO as PCP - General (Family Medicine)  Mariah Chavira DO as PCP - [facility] Empaneled Provider        Chief Complaint   Patient presents with    Nail Problem    Ingrown Toenail     bl great toes    Toe Pain     bl great toes         HPI:   Randee Han is a 21 y.o. female who presents to the office today complaining of  Possible toenail pain to jomar great toes. Symptoms began 6 month(s) ago. Patient relates pain is present and constant. Pain is rated 8 out of 10 and is described as none. Treatments prior to today's visit include: oral lamisil and previous nail avulsion but another podiatrist.  Currently denies F/C/N/V. Pt's primary care physician is Mariah Chavira DO last seen January 4 2021 patient states she had ingrown toenail removed in October and states they healed well. She states she thinks she has a fungus now due to the ingrown toenail removal and soakings. No Known Allergies    Past Medical History:   Diagnosis Date    Autism        Prior to Admission medications    Medication Sig Start Date End Date Taking? Authorizing Provider   risperiDONE (RISPERDAL) 1 MG tablet take 1 tablet by mouth twice a day 3/3/21  Yes Historical Provider, MD   hydrOXYzine (VISTARIL) 25 MG capsule take 1 tablet by mouth twice a day if needed 2/11/21  Yes Historical Provider, MD   terbinafine (LAMISIL) 250 MG tablet take 1 tablet by mouth once daily 3/1/21  Yes Jose Cruz Briggs DPM   Norgestim-Eth Estrad Triphasic (TRI-SPRINTEC) 0.18/0.215/0.25 MG-35 MCG TABS Take 1 tablet by mouth daily   Yes Historical Provider, MD   zolpidem (AMBIEN) 10 MG tablet Take 1 tablet by mouth nightly as needed for Sleep for up to 30 days.  3/30/21 4/29/21 Preet Mcnally, APRN - CNP   cyclobenzaprine (FLEXERIL) 5 MG tablet take 1 tablet by mouth nightly if needed for muscle spasm 3/29/21   Lucas Ennis APRN - CNP   clindamycin-benzoyl peroxide (BENZACLIN) 1-5 % gel Apply topically 2 times daily. 1/4/21   Rehana Jones DO   naproxen (NAPROSYN) 500 MG tablet Take 1 tablet by mouth 2 times daily (with meals) As needed for pain 1/4/21   DO Guevara       History reviewed. No pertinent surgical history. Family History   Problem Relation Age of Onset    Graves Disease Mother        Social History     Tobacco Use    Smoking status: Never Smoker    Smokeless tobacco: Never Used   Substance Use Topics    Alcohol use: Never     Frequency: Never       Review of Systems    Review of Systems:   History obtained from chart review and the patient  General ROS: negative for - chills, fatigue, fever, night sweats or weight gain  Constitutional: Negative for chills, diaphoresis, fatigue, fever and unexpected weight change. Musculoskeletal: Positive for arthralgias, gait problem and joint swelling. Neurological ROS: negative for - behavioral changes, confusion, headaches or seizures. Negative for weakness and numbness. Dermatological ROS: negative for - mole changes, rash  Cardiovascular: Negative for leg swelling. Gastrointestinal: Negative for constipation, diarrhea, nausea and vomiting. Lower Extremity Physical Examination:   Vitals:   Vitals:    03/25/21 1539   Temp: 97.3 °F (36.3 °C)     General: AAO x 3 in NAD. Dermatologic Exam:  jomar hallux nails are thickened incurvated dystrophic and discolored. Musculoskeletal:     1st MPJ ROM decreased, Bilateral.  Muscle strength 5/5, Bilateral.  Medial longitudinal arch, Bilateral WNL.   Ankle ROM WNL,Bilateral.    Dorsally contracted digits absent digits 1-5 Bilateral.     Vascular: DP and PT pulses palpable 2/4, Bilateral.  CFT <3 seconds, Bilateral.  Hair growth present to the level of compliance postoperatively is of utmost importance. Any deviation on behalf of the patient will decrease the chances of a successful outcome. Patient acknowledged, understands, and would like to move forward with surgery as discussed. The patient was given a consent outlining the general risk of surgery as well as the specifics to the surgical plan. This was carefully discussed giving all options, indications and contraindications regarding the procedure outlined in the consent. All questions were answered to the patient's satisfaction. The patient signed the consent form confirming complete understanding and acceptance of the risks of stated. I specifically stated and inquired if the patient understands and accepts the risks of surgery including infection, failure, prolonged pain, swelling, numbness, recurrence, limited mobility,painful scar, RSDS, overcorrection, under-correction, and loss of limb/life. Death, bleeding, blood clots in the veins or lungs, tendon or blood vessel disturbance, bony conditions, continued pain,stiffness, weakness, and limited function. These were all listed on the consent. Additionally, the postoperative course and treatment was outlined for the patient. Discussion consisted of postoperatively the patient needs to keep the foot elevated for at least the first initial two weeks. I have encouraged movements such as moving from the bed to the sofa or recliner, to the kitchen and the bathroom; quick bursts of movement with the foot elevated. Longstanding periods of time such as cooking, cleaning, and shopping are not permitted. I reminded the patient that there are only two reasons to have surgery. That being, if their function is impaired and also if they are having pain. If they can answer yes to both these questions, I will move forward with surgery. If they can not, there is no reason to proceed with surgical intervention.     Pt does elect to have the procedure above    A total of 35 minutes was spent with this patients encounter    Verbal and written instructions given to patient. Contact office with any questions/problems/concerns.   RTC in 2month(s).    3/25/2021    Electronically signed by Maulik Madden DPM on 4/1/2021 at 12:38 PM  3/25/2021

## 2021-04-05 NOTE — FLOWSHEET NOTE
[] [facility]) [facility] &  Therapy  [address]    [phone]  F: [phone]   [] [address] [facility] [address]   [address]  P: [phone]  F: [phone]  [] [address] &  Therapy  [address]  P: [phone]  F: [phone] [] [address]  P: [phone]  F: [phone]  [] [address]  [address] [address] [facility]   [address]   [place]: [phone]  F: [phone]   [] [address]  [place]: [phone]   F: [phone]     Physical Therapy Cancel/No Show note    Date: 2021  Patient: Marylen Evener  : 2000  MRN: [id number]    Cancels/No Shows to date: 3    For today's appointment patient:    []  Cancelled    [x] Rescheduled appointment    [] No-show     Reason given by patient:    []  Patient ill    []  Conflicting appointment    [] No transportation      [] Conflict with work    [] No reason given    [] Weather related    [] [id number]    [] Other:      Comments:        [x] Next appointment was confirmed    Electronically signed by: Romain Garrett

## 2021-04-08 ENCOUNTER — OFFICE VISIT (OUTPATIENT)
Dept: PRIMARY CARE CLINIC | Age: 21
End: 2021-04-08
Payer: COMMERCIAL

## 2021-04-08 VITALS — SYSTOLIC BLOOD PRESSURE: 116 MMHG | OXYGEN SATURATION: 100 % | HEART RATE: 93 BPM | DIASTOLIC BLOOD PRESSURE: 80 MMHG

## 2021-04-08 DIAGNOSIS — G47.00 INSOMNIA, UNSPECIFIED TYPE: Primary | ICD-10-CM

## 2021-04-08 DIAGNOSIS — B35.1 ONYCHOMYCOSIS: ICD-10-CM

## 2021-04-08 DIAGNOSIS — M54.9 UPPER BACK PAIN: ICD-10-CM

## 2021-04-08 PROCEDURE — 1036F TOBACCO NON-USER: CPT | Performed by: FAMILY MEDICINE

## 2021-04-08 PROCEDURE — 99214 OFFICE O/P EST MOD 30 MIN: CPT | Performed by: FAMILY MEDICINE

## 2021-04-08 PROCEDURE — G8427 DOCREV CUR MEDS BY ELIG CLIN: HCPCS | Performed by: FAMILY MEDICINE

## 2021-04-08 PROCEDURE — G8420 CALC BMI NORM PARAMETERS: HCPCS | Performed by: FAMILY MEDICINE

## 2021-04-08 RX ORDER — LIDOCAINE 50 MG/G
1 PATCH TOPICAL DAILY
Qty: 30 PATCH | Refills: 5 | Status: SHIPPED | OUTPATIENT
Start: 2021-04-08 | End: 2021-05-08

## 2021-04-08 RX ORDER — TIZANIDINE 2 MG/1
2 TABLET ORAL 2 TIMES DAILY PRN
Qty: 60 TABLET | Refills: 3 | Status: SHIPPED | OUTPATIENT
Start: 2021-04-08 | End: 2021-08-05

## 2021-04-08 NOTE — PROGRESS NOTES
[address] PRIMARY CARE  [address]   [address]  [address] [address]  Dept: [phone]  Dept Fax: [phone]    Eugenio Mistry is a 21 y.o. female who presents today for her medical conditions/complaints as noted below. Eugenio Mistry is c/o of  Chief Complaint   Patient presents with    Annual Exam     f/u, low folic acid         HPI:     HPI    Pt here for follow up regarding insomnia and her chronic conditions     She has established care with psychiatry at [facility] and has been following with Cristy there. Mom states she has been doing well with her and also seeing therapist as well. Pt mom states she had a gene sight test and shows has low folic acid conversion. She has been taking ambien for insomnia and it does help. She still has upper thoracic discomfort on left side, has been doing physical therapy and taking flexeril but states has to take a lot of flexeril for it to help. Seen by podiatrist will be getting surgery in may for ingrown toenails. Tried antifungal and not helping with her fungal infection in her toes as well. No results found for: LABA1C          ( goal A1C is < 7)   No results found for: LABMICR  No results found for: LDLCHOLESTEROL, LDLCALC    (goal LDL is <100)   AST (U/L)   Date Value   02/03/2021 13     ALT (U/L)   Date Value   02/03/2021 14     BP Readings from Last 3 Encounters:   04/08/21 116/80   01/04/21 116/78          (goal 120/80)    Past Medical History:   Diagnosis Date    Autism       No past surgical history on file.     Family History   Problem Relation Age of Onset    Graves Disease Mother        Social History     Tobacco Use    Smoking status: Never Smoker    Smokeless tobacco: Never Used   Substance Use Topics    Alcohol use: Never     Frequency: Never      Current Outpatient Medications   Medication Sig Dispense Refill    lidocaine (LIDODERM) 5 % Place 1 patch onto the skin daily 12 hours on, 12 hours off. 30 patch 5    tiZANidine (ZANAFLEX) 2 MG tablet Take 1 tablet by mouth 2 times daily as needed (pain) 60 tablet 3    zolpidem (AMBIEN) 10 MG tablet Take 1 tablet by mouth nightly as needed for Sleep for up to 30 days. 30 tablet 0    risperiDONE (RISPERDAL) 1 MG tablet take 1 tablet by mouth twice a day      hydrOXYzine (VISTARIL) 25 MG capsule take 1 tablet by mouth twice a day if needed      terbinafine (LAMISIL) 250 MG tablet take 1 tablet by mouth once daily 30 tablet 0    clindamycin-benzoyl peroxide (BENZACLIN) 1-5 % gel Apply topically 2 times daily. 35 g 3    naproxen (NAPROSYN) 500 MG tablet Take 1 tablet by mouth 2 times daily (with meals) As needed for pain 30 tablet 0    Norgestim-Eth Estrad Triphasic (TRI-SPRINTEC) 0.18/0.215/0.25 MG-35 MCG TABS Take 1 tablet by mouth daily       No current facility-administered medications for this visit. No Known Allergies    Health Maintenance   Topic Date Due    Hepatitis C screen  Never done    DTaP/Tdap/Td vaccine (6 - Tdap) 07/31/2011    HIV screen  Never done    COVID-19 Vaccine (1) Never done    Chlamydia screen  Never done    Flu vaccine (Season Ended) 09/01/2021    Hepatitis A vaccine  Completed    Hepatitis B vaccine  Completed    Hib vaccine  Completed    HPV vaccine  Completed    Varicella vaccine  Completed    Meningococcal (ACWY) vaccine  Completed    Pneumococcal 0-64 years Vaccine  Aged Out       Subjective:      Review of Systems   Constitutional: Negative for appetite change, chills and fever. HENT: Negative for sore throat. Eyes: Negative for visual disturbance. Respiratory: Negative for chest tightness and shortness of breath. Cardiovascular: Negative for chest pain. Gastrointestinal: Negative for nausea and vomiting. Musculoskeletal: Positive for back pain. Psychiatric/Behavioral: Positive for sleep disturbance.        Objective:     Physical Exam  Constitutional:       Appearance: She is well-developed. HENT:      Head: Normocephalic and atraumatic. Eyes:      Conjunctiva/sclera: Conjunctivae normal.      Pupils: Pupils are equal, round, and reactive to light. Neck:      Musculoskeletal: Neck supple. Cardiovascular:      Rate and Rhythm: Normal rate and regular rhythm. Heart sounds: Normal heart sounds. Pulmonary:      Effort: Pulmonary effort is normal.      Breath sounds: Normal breath sounds. Abdominal:      General: Bowel sounds are normal. There is no distension. Palpations: Abdomen is soft. Tenderness: There is no abdominal tenderness. Musculoskeletal:      Comments: ttp left thoracic paraspinal area   Skin:     General: Skin is warm and dry. Neurological:      Mental Status: She is alert and oriented to person, place, and time. Psychiatric:         Mood and Affect: Mood normal.         Thought Content: Thought content normal.       /80   Pulse 93   SpO2 100%     Assessment:      1. Insomnia, unspecified type  - currently on ambien. Has established care with psychiatry who will be taking over treatment for her insomnia as well. Pt also had genesight testing. 2. Upper back pain  - still with upper back pain, PT helps somewhat. I recommend trying tizanidine instead of flexeril and also recommend trial of lidoderm patch as well. 3. Onychomycosis  - was on antifungal but states has not been helpful and has ingrown toenails so will be getting surgery for this soon. Plan:      Return in about 3 months (around 7/8/2021) for follow up. No orders of the defined types were placed in this encounter. Orders Placed This Encounter   Medications    lidocaine (LIDODERM) 5 %     Sig: Place 1 patch onto the skin daily 12 hours on, 12 hours off.      Dispense:  30 patch     Refill:  5    tiZANidine (ZANAFLEX) 2 MG tablet     Sig: Take 1 tablet by mouth 2 times daily as needed (pain)     Dispense:  60 tablet     Refill:  3        Patient given educational materials - see patient instructions. Discussed use, benefit, and side effects of prescribed medications. All patient questions answered. Pt voiced understanding. Reviewed healthmaintenance. Instructed to continue current medications, diet and exercise. Patient agreed with treatment plan. Follow up as directed.      Electronically signed by Len Rinne, DO on 4/11/2021 at 8:58 AM

## 2021-04-11 ASSESSMENT — ENCOUNTER SYMPTOMS
SORE THROAT: 0
BACK PAIN: 1
NAUSEA: 0
SHORTNESS OF BREATH: 0
CHEST TIGHTNESS: 0
VOMITING: 0

## 2021-04-29 ENCOUNTER — HOSPITAL ENCOUNTER (OUTPATIENT)
Dept: PHYSICAL THERAPY | Facility: CLINIC | Age: 21
Setting detail: THERAPIES SERIES
Discharge: HOME | End: 2021-04-29
Payer: COMMERCIAL

## 2021-04-29 PROCEDURE — 97110 THERAPEUTIC EXERCISES: CPT

## 2021-04-29 NOTE — FLOWSHEET NOTE
[x] [facility]CTORS [facility]. [facility]    [address]     Phone: [phone]     Fax:  [phone]     Physical Therapy Daily Treatment Note     Date:  2021  Patient: Hector Saha  : 2000  MRN: [id number]  Physician: [address]: Jaspal Arevalo              Eligibility Status:  Eligible     Secondary Insurance (if applicable)               Eligibility Status: n/a  DOS:   # of visits allowed/remaining: unlimited  Source: Phone  Spoke Jessie Adams  Reference: [id number]  Medical Diagnosis: L upper back, L hip pain    Rehab Codes: M54.9, M25.552  Onset Date:      Visit# / total visits:                               Cancels/No Shows: 0                                Subjective:       Pain:  [x] Yes  [] No Pain Rating: (0-10 scale) 4-5/10  Pain altered Tx:  [] Yes  [x] No  Action:  Comments: Pt reports not being able to do her HEP much lately. Objective: Todays Treatment: 2021 Visit #6    Exercise Reps/ Time Weight/ Level Comments   UBE 5 min     Doorway stretch 5x10s     Supine stick flex 5x10s     Supine SA punch 2x10 2#    SL ER 2x10 1#    Prone rows 2x10 1#    Stick Bench Press x10     Wall Push Up 2x10           Bridges 2x10     Clamshells 2x10 lime    SAQ x10     LAQ x10             Other:     Specific Instructions for next treatment:    Treatment Charges: Mins Units   []  Modalities     [x]  Ther Exercise 40 3   []  Manual Therapy     []  Ther Activities     []  Aquatics       Assessment: [x] Progressing toward goals. [] No change. [x] Other: Pt guarded and apprehensive with most movements, improves with light tactile cuing and encouragement. Able to resume hip strengthening today. C/o pain in the hip flexors as well as L posterior ribcage.      SHORT TERM GOALS ( 8 visits)  Shldr, hip pain = 0  Shldr, hip ROM = WNL  Shldr, hip strength = 4+/5  Shldr, hip function: reach overhead, behind back, lift/carry, push/pull w/o pain     LONG TERM GOALS ( 12 visits)  Independent Home Exercise program  Return to normal activity  Pt. Education:  [x] Yes  [] No  [] Reviewed Prior HEP/Ed  Method of Education: [x] Verbal  [] Demo  [] Written  Comprehension of Education:  [x] Verbalizes understanding. [] Demonstrates understanding. [] Needs review. [] Demonstrates/verbalizes HEP/Ed previously given. Plan: [x] Continue per plan of care.     [] Other:     Time In:  5:00 pm       Time Out: 5:55 pm    Electronically signed by:  Vergia Boast, PTA

## 2021-04-30 DIAGNOSIS — G47.00 INSOMNIA, UNSPECIFIED TYPE: ICD-10-CM

## 2021-04-30 RX ORDER — ZOLPIDEM TARTRATE 10 MG/1
TABLET ORAL
Qty: 30 TABLET | OUTPATIENT
Start: 2021-04-30

## 2021-05-03 ENCOUNTER — HOSPITAL ENCOUNTER (OUTPATIENT)
Dept: LAB | Age: 21
Setting detail: SPECIMEN
Discharge: HOME | End: 2021-05-03
Payer: COMMERCIAL

## 2021-05-03 DIAGNOSIS — Z01.818 PREOP TESTING: Primary | ICD-10-CM

## 2021-05-03 PROCEDURE — U0003 INFECTIOUS AGENT DETECTION BY NUCLEIC ACID (DNA OR RNA); SEVERE ACUTE RESPIRATORY SYNDROME CORONAVIRUS 2 (SARS-COV-2) (CORONAVIRUS DISEASE [COVID-19]), AMPLIFIED PROBE TECHNIQUE, MAKING USE OF HIGH THROUGHPUT TECHNOLOGIES AS DESCRIBED BY CMS-2020-01-R: HCPCS

## 2021-05-03 PROCEDURE — U0005 INFEC AGEN DETEC AMPLI PROBE: HCPCS

## 2021-05-04 LAB
SARS-COV-2: NORMAL
SARS-COV-2: NOT DETECTED
SOURCE: NORMAL

## 2021-05-07 ENCOUNTER — ANESTHESIA EVENT (OUTPATIENT)
Dept: OPERATING ROOM | Age: 21
End: 2021-05-07
Payer: COMMERCIAL

## 2021-05-07 ENCOUNTER — ANESTHESIA (OUTPATIENT)
Dept: OPERATING ROOM | Age: 21
End: 2021-05-07
Payer: COMMERCIAL

## 2021-05-07 ENCOUNTER — HOSPITAL ENCOUNTER (OUTPATIENT)
Age: 21
Setting detail: OUTPATIENT SURGERY
Discharge: HOME | End: 2021-05-07
Attending: PODIATRIST | Admitting: PODIATRIST
Payer: COMMERCIAL

## 2021-05-07 VITALS
HEIGHT: 70 IN | OXYGEN SATURATION: 100 % | TEMPERATURE: 97.2 F | SYSTOLIC BLOOD PRESSURE: 105 MMHG | WEIGHT: 160 LBS | DIASTOLIC BLOOD PRESSURE: 67 MMHG | HEART RATE: 88 BPM | RESPIRATION RATE: 20 BRPM | BODY MASS INDEX: 22.9 KG/M2

## 2021-05-07 VITALS — SYSTOLIC BLOOD PRESSURE: 96 MMHG | OXYGEN SATURATION: 100 % | DIASTOLIC BLOOD PRESSURE: 47 MMHG

## 2021-05-07 DIAGNOSIS — Z98.890 STATUS POST SURGICAL REMOVAL OF NAIL MATRIX OF TOE OF LEFT FOOT: ICD-10-CM

## 2021-05-07 DIAGNOSIS — Z98.890 STATUS POST SURGICAL REMOVAL OF NAIL MATRIX OF TOE OF RIGHT FOOT: Primary | ICD-10-CM

## 2021-05-07 PROCEDURE — 2709999900 HC NON-CHARGEABLE SUPPLY: Performed by: PODIATRIST

## 2021-05-07 PROCEDURE — 3700000001 HC ADD 15 MINUTES (ANESTHESIA): Performed by: PODIATRIST

## 2021-05-07 PROCEDURE — 7100000011 HC PHASE II RECOVERY - ADDTL 15 MIN: Performed by: PODIATRIST

## 2021-05-07 PROCEDURE — 2580000003 HC RX 258: Performed by: ANESTHESIOLOGY

## 2021-05-07 PROCEDURE — 2500000003 HC RX 250 WO HCPCS: Performed by: PODIATRIST

## 2021-05-07 PROCEDURE — 11750 EXCISION NAIL&NAIL MATRIX: CPT | Performed by: PODIATRIST

## 2021-05-07 PROCEDURE — 3600000002 HC SURGERY LEVEL 2 BASE: Performed by: PODIATRIST

## 2021-05-07 PROCEDURE — 3600000012 HC SURGERY LEVEL 2 ADDTL 15MIN: Performed by: PODIATRIST

## 2021-05-07 PROCEDURE — 6360000002 HC RX W HCPCS: Performed by: ANESTHESIOLOGY

## 2021-05-07 PROCEDURE — 7100000010 HC PHASE II RECOVERY - FIRST 15 MIN: Performed by: PODIATRIST

## 2021-05-07 PROCEDURE — 6360000002 HC RX W HCPCS: Performed by: NURSE ANESTHETIST, CERTIFIED REGISTERED

## 2021-05-07 PROCEDURE — 2500000003 HC RX 250 WO HCPCS: Performed by: NURSE ANESTHETIST, CERTIFIED REGISTERED

## 2021-05-07 PROCEDURE — 3700000000 HC ANESTHESIA ATTENDED CARE: Performed by: PODIATRIST

## 2021-05-07 PROCEDURE — 6360000002 HC RX W HCPCS: Performed by: STUDENT IN AN ORGANIZED HEALTH CARE EDUCATION/TRAINING PROGRAM

## 2021-05-07 RX ORDER — HYDROMORPHONE HYDROCHLORIDE 1 MG/ML
0.5 INJECTION, SOLUTION INTRAMUSCULAR; INTRAVENOUS; SUBCUTANEOUS EVERY 5 MIN PRN
Status: DISCONTINUED | OUTPATIENT
Start: 2021-05-07 | End: 2021-05-07 | Stop reason: HOSPADM

## 2021-05-07 RX ORDER — MIDAZOLAM HYDROCHLORIDE 1 MG/ML
2 INJECTION INTRAMUSCULAR; INTRAVENOUS ONCE
Status: COMPLETED | OUTPATIENT
Start: 2021-05-07 | End: 2021-05-07

## 2021-05-07 RX ORDER — LABETALOL HYDROCHLORIDE 5 MG/ML
5 INJECTION, SOLUTION INTRAVENOUS EVERY 10 MIN PRN
Status: DISCONTINUED | OUTPATIENT
Start: 2021-05-07 | End: 2021-05-07 | Stop reason: HOSPADM

## 2021-05-07 RX ORDER — MEPERIDINE HYDROCHLORIDE 50 MG/ML
12.5 INJECTION INTRAMUSCULAR; INTRAVENOUS; SUBCUTANEOUS EVERY 5 MIN PRN
Status: DISCONTINUED | OUTPATIENT
Start: 2021-05-07 | End: 2021-05-07 | Stop reason: HOSPADM

## 2021-05-07 RX ORDER — MIDAZOLAM HYDROCHLORIDE 1 MG/ML
INJECTION INTRAMUSCULAR; INTRAVENOUS PRN
Status: DISCONTINUED | OUTPATIENT
Start: 2021-05-07 | End: 2021-05-07 | Stop reason: SDUPTHER

## 2021-05-07 RX ORDER — ONDANSETRON 2 MG/ML
4 INJECTION INTRAMUSCULAR; INTRAVENOUS
Status: DISCONTINUED | OUTPATIENT
Start: 2021-05-07 | End: 2021-05-07 | Stop reason: HOSPADM

## 2021-05-07 RX ORDER — SODIUM CHLORIDE, SODIUM LACTATE, POTASSIUM CHLORIDE, CALCIUM CHLORIDE 600; 310; 30; 20 MG/100ML; MG/100ML; MG/100ML; MG/100ML
INJECTION, SOLUTION INTRAVENOUS CONTINUOUS
Status: DISCONTINUED | OUTPATIENT
Start: 2021-05-07 | End: 2021-05-07 | Stop reason: HOSPADM

## 2021-05-07 RX ORDER — ACETAMINOPHEN AND CODEINE PHOSPHATE 300; 30 MG/1; MG/1
1 TABLET ORAL EVERY 6 HOURS PRN
Qty: 28 TABLET | Refills: 0 | Status: SHIPPED | OUTPATIENT
Start: 2021-05-07 | End: 2021-05-14

## 2021-05-07 RX ORDER — LIDOCAINE HYDROCHLORIDE 20 MG/ML
INJECTION, SOLUTION EPIDURAL; INFILTRATION; INTRACAUDAL; PERINEURAL PRN
Status: DISCONTINUED | OUTPATIENT
Start: 2021-05-07 | End: 2021-05-07 | Stop reason: SDUPTHER

## 2021-05-07 RX ORDER — LIDOCAINE HYDROCHLORIDE 10 MG/ML
1 INJECTION, SOLUTION EPIDURAL; INFILTRATION; INTRACAUDAL; PERINEURAL
Status: DISCONTINUED | OUTPATIENT
Start: 2021-05-07 | End: 2021-05-07 | Stop reason: HOSPADM

## 2021-05-07 RX ORDER — PROPOFOL 10 MG/ML
INJECTION, EMULSION INTRAVENOUS PRN
Status: DISCONTINUED | OUTPATIENT
Start: 2021-05-07 | End: 2021-05-07 | Stop reason: SDUPTHER

## 2021-05-07 RX ORDER — HYDRALAZINE HYDROCHLORIDE 20 MG/ML
5 INJECTION INTRAMUSCULAR; INTRAVENOUS EVERY 10 MIN PRN
Status: DISCONTINUED | OUTPATIENT
Start: 2021-05-07 | End: 2021-05-07 | Stop reason: HOSPADM

## 2021-05-07 RX ORDER — OXYCODONE HYDROCHLORIDE AND ACETAMINOPHEN 5; 325 MG/1; MG/1
1 TABLET ORAL
Status: DISCONTINUED | OUTPATIENT
Start: 2021-05-07 | End: 2021-05-07 | Stop reason: HOSPADM

## 2021-05-07 RX ADMIN — PROPOFOL 20 MG: 10 INJECTION, EMULSION INTRAVENOUS at 11:26

## 2021-05-07 RX ADMIN — MIDAZOLAM 2 MG: 1 INJECTION INTRAMUSCULAR; INTRAVENOUS at 09:51

## 2021-05-07 RX ADMIN — PROPOFOL 20 MG: 10 INJECTION, EMULSION INTRAVENOUS at 11:31

## 2021-05-07 RX ADMIN — PROPOFOL 20 MG: 10 INJECTION, EMULSION INTRAVENOUS at 11:34

## 2021-05-07 RX ADMIN — SODIUM CHLORIDE, POTASSIUM CHLORIDE, SODIUM LACTATE AND CALCIUM CHLORIDE: 600; 310; 30; 20 INJECTION, SOLUTION INTRAVENOUS at 09:51

## 2021-05-07 RX ADMIN — PROPOFOL 20 MG: 10 INJECTION, EMULSION INTRAVENOUS at 11:37

## 2021-05-07 RX ADMIN — PROPOFOL 40 MG: 10 INJECTION, EMULSION INTRAVENOUS at 11:18

## 2021-05-07 RX ADMIN — PROPOFOL 20 MG: 10 INJECTION, EMULSION INTRAVENOUS at 11:21

## 2021-05-07 RX ADMIN — PROPOFOL 20 MG: 10 INJECTION, EMULSION INTRAVENOUS at 11:23

## 2021-05-07 RX ADMIN — PROPOFOL 50 MG: 10 INJECTION, EMULSION INTRAVENOUS at 11:17

## 2021-05-07 RX ADMIN — SODIUM CHLORIDE, POTASSIUM CHLORIDE, SODIUM LACTATE AND CALCIUM CHLORIDE: 600; 310; 30; 20 INJECTION, SOLUTION INTRAVENOUS at 11:11

## 2021-05-07 RX ADMIN — MIDAZOLAM 1 MG: 1 INJECTION INTRAMUSCULAR; INTRAVENOUS at 10:09

## 2021-05-07 RX ADMIN — LIDOCAINE HYDROCHLORIDE 100 MG: 20 INJECTION, SOLUTION EPIDURAL; INFILTRATION; INTRACAUDAL; PERINEURAL at 11:16

## 2021-05-07 RX ADMIN — MIDAZOLAM 2 MG: 1 INJECTION INTRAMUSCULAR; INTRAVENOUS at 11:14

## 2021-05-07 RX ADMIN — PROPOFOL 20 MG: 10 INJECTION, EMULSION INTRAVENOUS at 11:28

## 2021-05-07 RX ADMIN — CEFAZOLIN 2000 MG: 10 INJECTION, POWDER, FOR SOLUTION INTRAVENOUS at 11:20

## 2021-05-07 RX ADMIN — PROPOFOL 50 MG: 10 INJECTION, EMULSION INTRAVENOUS at 11:16

## 2021-05-07 ASSESSMENT — PAIN SCALES - GENERAL
PAINLEVEL_OUTOF10: 0
PAINLEVEL_OUTOF10: 0

## 2021-05-07 ASSESSMENT — PULMONARY FUNCTION TESTS
PIF_VALUE: 1

## 2021-05-07 ASSESSMENT — LIFESTYLE VARIABLES: SMOKING_STATUS: 0

## 2021-05-07 ASSESSMENT — PAIN - FUNCTIONAL ASSESSMENT: PAIN_FUNCTIONAL_ASSESSMENT: 0-10

## 2021-05-07 ASSESSMENT — PAIN DESCRIPTION - DESCRIPTORS: DESCRIPTORS: ACHING

## 2021-05-07 NOTE — ANESTHESIA PRE PROCEDURE
Department of Anesthesiology  Preprocedure Note       Name:  Mikael Flores   Age:  21 y.o.  :  2000                                          MRN:  [id number]         Date:  2021      Surgeon: Marissa Jones):  Lupe Elder DPM    Procedure: Procedure(s):  BILATERAL HALLUX PNA - PHENOL    Medications prior to admission:   Prior to Admission medications    Medication Sig Start Date End Date Taking? Authorizing Provider   zolpidem (AMBIEN) 10 MG tablet Take 1 tablet by mouth nightly as needed for Sleep for up to 30 days. 21  Rehana Medhkour, DO   lidocaine (LIDODERM) 5 % Place 1 patch onto the skin daily 12 hours on, 12 hours off. 21  Rehana Medhkour, DO   tiZANidine (ZANAFLEX) 2 MG tablet Take 1 tablet by mouth 2 times daily as needed (pain) 21   Rehana Medhkour, DO   risperiDONE (RISPERDAL) 1 MG tablet take 1 tablet by mouth twice a day 3/3/21   Historical Provider, MD   hydrOXYzine (VISTARIL) 25 MG capsule take 1 tablet by mouth twice a day if needed 21   Historical Provider, MD   terbinafine (LAMISIL) 250 MG tablet take 1 tablet by mouth once daily 3/1/21   Lupe Elder DPM   clindamycin-benzoyl peroxide (BENZACLIN) 1-5 % gel Apply topically 2 times daily.  21   Rehana Medhkour, DO   naproxen (NAPROSYN) 500 MG tablet Take 1 tablet by mouth 2 times daily (with meals) As needed for pain 21   Rehana Medhkour, DO   Norgestim-Eth Estrad Triphasic (TRI-SPRINTEC) 0.18/0.215/0.25 MG-35 MCG TABS Take 1 tablet by mouth daily    Historical Provider, MD       Current medications:    Current Facility-Administered Medications   Medication Dose Route Frequency Provider Last Rate Last Admin    [START ON 2021] lactated ringers infusion   Intravenous Continuous Trever Oliver MD        [START ON 2021] lidocaine PF 1 % injection 1 mL  1 mL Intradermal Once PRN Trever Oliver MD        ceFAZolin (ANCEF) 2000 mg in dextrose 5 % 50 mL IVPB  2,000 mg Intravenous Once Melida Rosas DPM Allergies:  No Known Allergies    Problem List:    Patient Active Problem List   Diagnosis Code    Autism spectrum disorder F84.0    Insomnia G47.00       Past Medical History:        Diagnosis Date    Autism        Past Surgical History:  No past surgical history on file. Social History:    Social History     Tobacco Use    Smoking status: Never Smoker    Smokeless tobacco: Never Used   Substance Use Topics    Alcohol use: Never     Frequency: Never                                Counseling given: Not Answered      Vital Signs (Current):   Vitals:    05/07/21 0855   BP: 125/82   Pulse: 136   Resp: 20   Temp: 98.6 °F (37 °C)   TempSrc: Oral   SpO2: 100%   Weight: 160 lb (72.6 kg)   Height: 5' 10\" (1.778 m)                                              BP Readings from Last 3 Encounters:   05/07/21 125/82   04/08/21 116/80   01/04/21 116/78       NPO Status:                                                                                 BMI:   Wt Readings from Last 3 Encounters:   05/07/21 160 lb (72.6 kg)   03/25/21 144 lb (65.3 kg)   01/07/21 144 lb (65.3 kg)     Body mass index is 22.96 kg/m². CBC: No results found for: WBC, RBC, HGB, HCT, MCV, RDW, PLT    CMP:   Lab Results   Component Value Date    PROT 7.1 02/03/2021    BILITOT 0.37 02/03/2021    ALKPHOS 55 02/03/2021    AST 13 02/03/2021    ALT 14 02/03/2021       POC Tests: No results for input(s): POCGLU, POCNA, POCK, POCCL, POCBUN, POCHEMO, POCHCT in the last 72 hours.     Coags: No results found for: PROTIME, INR, APTT    HCG (If Applicable): No results found for: PREGTESTUR, PREGSERUM, HCG, HCGQUANT     ABGs: No results found for: PHART, PO2ART, VGF0LCV, IXZ1OWI, BEART, T6DROOXM     Type & Screen (If Applicable):  No results found for: LABABO, LABRH    Drug/Infectious Status (If Applicable):  No results found for: HIV, HEPCAB    COVID-19 Screening (If Applicable):   Lab Results   Component Value Date    COVID19 Not Detected 05/03/2021 Anesthesia Evaluation  Patient summary reviewed and Nursing notes reviewed no history of anesthetic complications:   Airway: Mallampati: II  TM distance: >3 FB   Neck ROM: full  Mouth opening: > = 3 FB Dental: normal exam         Pulmonary:Negative Pulmonary ROS and normal exam  breath sounds clear to auscultation      (-) asthma, sleep apnea and not a current smoker                           Cardiovascular:Negative CV ROS  Exercise tolerance: good (>4 METS),       (-) hypertension      Rhythm: regular  Rate: normal                    Neuro/Psych:   (+) psychiatric history (Autism, PTSD):depression/anxiety    (-) seizures           GI/Hepatic/Renal:   (+) GERD: well controlled,      (-) liver disease and no renal disease       Endo/Other: Negative Endo/Other ROS       (-) diabetes mellitus, hypothyroidism, hyperthyroidism               Abdominal:           Vascular:                                    Anesthesia Plan      MAC     ASA 2       Induction: intravenous. MIPS: Postoperative opioids intended and Prophylactic antiemetics administered. Anesthetic plan and risks discussed with patient and mother. Plan discussed with CRNA.               Irma Izquierdo MD   5/7/2021

## 2021-05-07 NOTE — H&P
History and Physical Update    Pt Name: Jennifer Horta  MRN: [id number]  [place]: 2000  Date of evaluation: 5/7/2021      [x] I have reviewed the PCP Progress Note by Dr Dennis Hill dated 4/8/21 in epic which meets the criteria for an Interval History and Physical note and is attached below. [x] I have examined  Jennifer Horta, a 20 yo high functioning autistic female who is cooperative and anxious   Mother is in attendance   There are no changes to the patient who is scheduled for a bilateral hallux PNA - phenol by Dr Minerva Jay for ingrown toenails bilateral hallux. The patient denies new health changes, fever, chills, wheezing, cough, increased SOB, chest pain, open sores or wounds. Last naprosyn > month     Vital signs: /82   Pulse 136   Temp 98.6 °F (37 °C) (Oral)   Resp 20   Ht 5' 10\" (1.778 m)   Wt 160 lb (72.6 kg)   LMP 04/23/2021   SpO2 100%   BMI 22.96 kg/m²     Allergies:  Patient has no known allergies. Medications:    Prior to Admission medications    Medication Sig Start Date End Date Taking? Authorizing Provider   zolpidem (AMBIEN) 10 MG tablet Take 1 tablet by mouth nightly as needed for Sleep for up to 30 days. 5/5/21 6/4/21 Yes Rehana Medhkour, DO   tiZANidine (ZANAFLEX) 2 MG tablet Take 1 tablet by mouth 2 times daily as needed (pain) 4/8/21  Yes Rehana Medhkour, DO   Norgestim-Eth Estrad Triphasic (TRI-SPRINTEC) 0.18/0.215/0.25 MG-35 MCG TABS Take 1 tablet by mouth daily   Yes Historical Provider, MD   lidocaine (LIDODERM) 5 % Place 1 patch onto the skin daily 12 hours on, 12 hours off. 4/8/21 5/8/21  Rehana Medhkour, DO   naproxen (NAPROSYN) 500 MG tablet Take 1 tablet by mouth 2 times daily (with meals) As needed for pain 1/4/21   Dennis Hill,          This is a 21 y. o.high functioning austic female who is pleasant, anxious, cooperative, alert and oriented x3, in no acute distress.  Patient taps her chest when anxious but stopped when I asked to listen to Seen by podiatrist will be getting surgery in may for ingrown toenails. Tried antifungal and not helping with her fungal infection in her toes as well. No results found for: LABA1C                                                                        ( goal A1C is < 7)   No results found for: LABMICR  No results found for: LDLCHOLESTEROL, LDLCALC                                              (goal LDL is <100)       AST (U/L)   Date Value   02/03/2021 13          ALT (U/L)   Date Value   02/03/2021 14          BP Readings from Last 3 Encounters:   04/08/21 116/80   01/04/21 116/78                                                                                       (goal 120/80)     Past Medical History        Past Medical History:   Diagnosis Date    Autism           Past Surgical History   No past surgical history on file. Family History         Family History   Problem Relation Age of Onset    Graves Disease Mother              Social History            Tobacco Use    Smoking status: Never Smoker    Smokeless tobacco: Never Used   Substance Use Topics    Alcohol use: Never       Frequency: Never      Current Facility-Administered Medications          Current Outpatient Medications   Medication Sig Dispense Refill    lidocaine (LIDODERM) 5 % Place 1 patch onto the skin daily 12 hours on, 12 hours off. 30 patch 5    tiZANidine (ZANAFLEX) 2 MG tablet Take 1 tablet by mouth 2 times daily as needed (pain) 60 tablet 3    zolpidem (AMBIEN) 10 MG tablet Take 1 tablet by mouth nightly as needed for Sleep for up to 30 days. 30 tablet 0    risperiDONE (RISPERDAL) 1 MG tablet take 1 tablet by mouth twice a day        hydrOXYzine (VISTARIL) 25 MG capsule take 1 tablet by mouth twice a day if needed        terbinafine (LAMISIL) 250 MG tablet take 1 tablet by mouth once daily 30 tablet 0    clindamycin-benzoyl peroxide (BENZACLIN) 1-5 % gel Apply topically 2 times daily.  35 g 3    naproxen (NAPROSYN) 500 MG tablet Take 1 tablet by mouth 2 times daily (with meals) As needed for pain 30 tablet 0    Norgestim-Eth Estrad Triphasic (TRI-SPRINTEC) 0.18/0.215/0.25 MG-35 MCG TABS Take 1 tablet by mouth daily          No current facility-administered medications for this visit. No Known Allergies          Health Maintenance   Topic Date Due    Hepatitis C screen  Never done    DTaP/Tdap/Td vaccine (6 - Tdap) 07/31/2011    HIV screen  Never done    COVID-19 Vaccine (1) Never done    Chlamydia screen  Never done    Flu vaccine (Season Ended) 09/01/2021    Hepatitis A vaccine  Completed    Hepatitis B vaccine  Completed    Hib vaccine  Completed    HPV vaccine  Completed    Varicella vaccine  Completed    Meningococcal (ACWY) vaccine  Completed    Pneumococcal 0-64 years Vaccine  Aged Out         Subjective:      Review of Systems   Constitutional: Negative for appetite change, chills and fever. HENT: Negative for sore throat. Eyes: Negative for visual disturbance. Respiratory: Negative for chest tightness and shortness of breath. Cardiovascular: Negative for chest pain. Gastrointestinal: Negative for nausea and vomiting. Musculoskeletal: Positive for back pain. Psychiatric/Behavioral: Positive for sleep disturbance. Objective:      Physical Exam  Constitutional:       Appearance: She is well-developed. HENT:      Head: Normocephalic and atraumatic. Eyes:      Conjunctiva/sclera: Conjunctivae normal.      Pupils: Pupils are equal, round, and reactive to light. Neck:      Musculoskeletal: Neck supple. Cardiovascular:      Rate and Rhythm: Normal rate and regular rhythm. Heart sounds: Normal heart sounds. Pulmonary:      Effort: Pulmonary effort is normal.      Breath sounds: Normal breath sounds. Abdominal:      General: Bowel sounds are normal. There is no distension. Palpations: Abdomen is soft. Tenderness:  There is no abdominal tenderness. Musculoskeletal:      Comments: ttp left thoracic paraspinal area   Skin:     General: Skin is warm and dry. Neurological:      Mental Status: She is alert and oriented to person, place, and time. Psychiatric:         Mood and Affect: Mood normal.         Thought Content: Thought content normal.         /80   Pulse 93   SpO2 100%      Assessment:   1. Insomnia, unspecified type  - currently on ambien. Has established care with psychiatry who will be taking over treatment for her insomnia as well. Pt also had genesight testing. 2. Upper back pain  - still with upper back pain, PT helps somewhat. I recommend trying tizanidine instead of flexeril and also recommend trial of lidoderm patch as well. 3. Onychomycosis  - was on antifungal but states has not been helpful and has ingrown toenails so will be getting surgery for this soon. Plan:   Return in about 3 months (around 7/8/2021) for follow up. No orders of the defined types were placed in this encounter. Encounter Medications         Orders Placed This Encounter   Medications    lidocaine (LIDODERM) 5 %       Sig: Place 1 patch onto the skin daily 12 hours on, 12 hours off. Dispense:  30 patch       Refill:  5    tiZANidine (ZANAFLEX) 2 MG tablet       Sig: Take 1 tablet by mouth 2 times daily as needed (pain)       Dispense:  60 tablet       Refill:  3           Patient given educational materials - see patient instructions. Discussed use, benefit, and side effects of prescribed medications. All patient questions answered. Pt voiced understanding. Reviewed healthmaintenance. Instructed to continue current medications, diet and exercise. Patient agreed with treatment plan. Follow up as directed.       Electronically signed by Carie Kumari DO on 4/11/2021 at 8:58 AM

## 2021-05-07 NOTE — OP NOTE
lidocaine plain was injected for digital block of left and right hallux. Both hallux were prepped with betadine. Digital tourniquet was applied to left and right hallux. Using a spatula the medial and lateral nail borders of each hallux nail was freed. Next, using a hemostat, the nail was removed in its' entirety from each hallux. The borders were curetted for any nail spicules. Microtip applicators were used to apply phenol to nail matrix. Once appropriate time of phenol was met, the area was cleaned with alcohol. A dressing of bacitracin, 2x2, rahul and coban was applied. This was done on both big toes. Tourniquet was removed from both toes. Patient tolerated procedure well. Patient's guardian was given instructions for care. Surgical shoes placed on both feet in PACU. Patient to follow-up with Dr. Sowmya Gibson as previously scheduled.     Electronically signed by Geremias Howard DPM on 5/7/2021 at 12:00 PM

## 2021-05-11 ENCOUNTER — NURSE TRIAGE (OUTPATIENT)
Dept: OTHER | Facility: CLINIC | Age: 21
End: 2021-05-11

## 2021-05-11 RX ORDER — ONDANSETRON 4 MG/1
4 TABLET, ORALLY DISINTEGRATING ORAL 3 TIMES DAILY PRN
Qty: 21 TABLET | Refills: 0 | Status: SHIPPED | OUTPATIENT
Start: 2021-05-11 | End: 2021-08-05

## 2021-05-11 NOTE — TELEPHONE ENCOUNTER
Received call from Andrew at [facility]) [place] with [employer] Complaint. No triage, patient is not present. Care advice provided, patient verbalizes understanding; denies any other questions or concerns; instructed to call back for any new or worsening symptoms. Writer provided warm transfer to Sooqini at [facility]  for appointment scheduling. Attention Provider: Thank you for allowing me to participate in the care of your patient. The patient was connected to triage in response to information provided to the [facility]. Please do not respond through this encounter as the response is not directed to a shared pool. Reason for Disposition   [1] Caller requesting NON-URGENT health information AND [2] PCP's office is the best resource    Answer Assessment - Initial Assessment Questions  1. REASON FOR CALL or QUESTION: \"What is your reason for calling today? \" or \"How can I best help you? \" or \"What question do you have that I can help answer? \"          Not triage, patient is not present. Mom calling regarding post op nausea.     Protocols used: INFORMATION ONLY CALL - NO TRIAGE-Pending sale to [facility]

## 2021-05-13 ENCOUNTER — OFFICE VISIT (OUTPATIENT)
Dept: PODIATRY | Age: 21
End: 2021-05-13

## 2021-05-13 VITALS — HEIGHT: 70 IN | WEIGHT: 160 LBS | BODY MASS INDEX: 22.9 KG/M2

## 2021-05-13 DIAGNOSIS — Z98.890 POST-OPERATIVE STATE: Primary | ICD-10-CM

## 2021-05-13 PROCEDURE — 99024 POSTOP FOLLOW-UP VISIT: CPT | Performed by: PODIATRIST

## 2021-05-13 RX ORDER — QUETIAPINE FUMARATE 50 MG/1
TABLET, FILM COATED ORAL
COMMUNITY
Start: 2021-04-20 | End: 2022-09-14

## 2021-05-13 RX ORDER — QUETIAPINE FUMARATE 100 MG/1
TABLET, FILM COATED ORAL
COMMUNITY
Start: 2021-04-20 | End: 2022-09-14

## 2021-05-13 RX ORDER — LEVOMEFOLATE CALCIUM 15 MG
TABLET ORAL
COMMUNITY
Start: 2021-04-22 | End: 2021-08-05

## 2021-05-21 NOTE — TELEPHONE ENCOUNTER
Last OV 01/04/2021    Health Maintenance   Topic Date Due    Hepatitis C screen  2000    DTaP/Tdap/Td vaccine (6 - Tdap) 07/31/2011    HIV screen  07/31/2015    Chlamydia screen  07/31/2016    Flu vaccine (1) 09/01/2020    Hepatitis A vaccine  Completed    Hib vaccine  Completed    HPV vaccine  Completed    Varicella vaccine  Completed    Meningococcal (ACWY) vaccine  Completed    Hepatitis B vaccine  Aged Out    Pneumococcal 0-64 years Vaccine  Aged Out             (applicable per patient's age: Cancer Screenings, Depression Screening, Fall Risk Screening, Immunizations)    AST (U/L)   Date Value   02/03/2021 13     ALT (U/L)   Date Value   02/03/2021 14      (goal A1C is < 7)   (goal LDL is <100) need 30-50% reduction from baseline     BP Readings from Last 3 Encounters:   01/04/21 116/78    (goal /80)      All Future Testing planned in [facility]:      Next Visit Date:  Future Appointments   Date Time Provider Elizabeth Flores   2/22/2021  6:00 PM Joanie Fraga,  [facility]   4/5/2021  1:00 PM DO Ruby Young [facility]   4/8/2021  3:30 PM Xi Durant DPM [facility]            Patient Active Problem List:     Autism spectrum disorder     Insomnia
home

## 2021-05-27 ENCOUNTER — OFFICE VISIT (OUTPATIENT)
Dept: PODIATRY | Age: 21
End: 2021-05-27

## 2021-05-27 VITALS — HEIGHT: 70 IN | BODY MASS INDEX: 22.9 KG/M2 | WEIGHT: 160 LBS

## 2021-05-27 DIAGNOSIS — Z98.890 POST-OPERATIVE STATE: Primary | ICD-10-CM

## 2021-05-27 PROCEDURE — 99024 POSTOP FOLLOW-UP VISIT: CPT | Performed by: PODIATRIST

## 2021-06-02 DIAGNOSIS — G47.00 INSOMNIA, UNSPECIFIED TYPE: ICD-10-CM

## 2021-06-02 RX ORDER — ZOLPIDEM TARTRATE 10 MG/1
10 TABLET ORAL NIGHTLY PRN
Qty: 30 TABLET | Refills: 0 | Status: SHIPPED | OUTPATIENT
Start: 2021-06-03 | End: 2021-07-01 | Stop reason: SDUPTHER

## 2021-06-03 ENCOUNTER — HOSPITAL ENCOUNTER (OUTPATIENT)
Dept: PHYSICAL THERAPY | Facility: CLINIC | Age: 21
Setting detail: THERAPIES SERIES
Discharge: HOME | End: 2021-06-03
Payer: COMMERCIAL

## 2021-06-03 NOTE — FLOWSHEET NOTE
[] [facility]) [facility]  Therapy  [address]    [phone]  F: [phone]   [] [address]  [facility]   [address]  P: [phone]  F: [phone]  [] [place]  [address]  P: [phone]  F: [phone] [] [address]  P: [phone]  F: [phone]  [] [address]  [address] [facility] [address]   [address]   [place]: [phone]  F: [phone]   [] [facility]  [address]  [place]: [phone]   F: [phone]     Physical Therapy Cancel/No Show note    Date: 6/3/2021  Patient: Ramandeep Boles  : 2000  MRN: [id number]    Cancels/No Shows to date:     For today's appointment patient:    [x]  Cancelled    [] Rescheduled appointment    [] No-show     Reason given by patient:    [x]  Patient ill    []  Conflicting appointment    [] No transportation      [] Conflict with work    [] No reason given    [] Weather related    [] [id number]    [] Other:      Comments:        [x] Next appointment was confirmed    Electronically signed by: Zamzam Green

## 2021-06-13 ENCOUNTER — HOSPITAL ENCOUNTER (OUTPATIENT)
Age: 21
Discharge: HOME | End: 2021-06-13
Payer: COMMERCIAL

## 2021-06-13 LAB
ABSOLUTE EOS #: 0.1 K/UL (ref 0–0.44)
ABSOLUTE IMMATURE GRANULOCYTE: <0.03 K/UL (ref 0–0.3)
ABSOLUTE LYMPH #: 2.89 K/UL (ref 1.2–5.2)
ABSOLUTE MONO #: 0.5 K/UL (ref 0.1–1.4)
ALBUMIN SERPL-MCNC: 4.1 G/DL (ref 3.5–5.2)
ALBUMIN/GLOBULIN RATIO: 1.4 (ref 1–2.5)
ALP BLD-CCNC: 48 U/L (ref 35–104)
ALT SERPL-CCNC: 10 U/L (ref 5–33)
AST SERPL-CCNC: 13 U/L
BASOPHILS # BLD: 0 % (ref 0–2)
BASOPHILS ABSOLUTE: 0.04 K/UL (ref 0–0.2)
BILIRUB SERPL-MCNC: 0.33 MG/DL (ref 0.3–1.2)
BILIRUBIN DIRECT: 0.11 MG/DL
BILIRUBIN, INDIRECT: 0.22 MG/DL (ref 0–1)
CHOLESTEROL, FASTING: 196 MG/DL
CHOLESTEROL/HDL RATIO: 1.8
DIFFERENTIAL TYPE: NORMAL
EOSINOPHILS RELATIVE PERCENT: 1 % (ref 1–4)
ESTIMATED AVERAGE GLUCOSE: 91 MG/DL
GLOBULIN: NORMAL G/DL (ref 1.5–3.8)
HBA1C MFR BLD: 4.8 % (ref 4–6)
HCT VFR BLD CALC: 39.1 % (ref 36.3–47.1)
HDLC SERPL-MCNC: 108 MG/DL
HEMOGLOBIN: 12.6 G/DL (ref 11.9–15.1)
IMMATURE GRANULOCYTES: 0 %
LDL CHOLESTEROL: 77 MG/DL (ref 0–130)
LYMPHOCYTES # BLD: 31 % (ref 25–45)
MAGNESIUM: 2.1 MG/DL (ref 1.6–2.6)
MCH RBC QN AUTO: 30.4 PG (ref 25.2–33.5)
MCHC RBC AUTO-ENTMCNC: 32.2 G/DL (ref 28.4–34.8)
MCV RBC AUTO: 94.2 FL (ref 82.6–102.9)
MONOCYTES # BLD: 5 % (ref 2–8)
NRBC AUTOMATED: 0 PER 100 WBC
PDW BLD-RTO: 12.1 % (ref 11.8–14.4)
PLATELET # BLD: 306 K/UL (ref 138–453)
PLATELET ESTIMATE: NORMAL
PMV BLD AUTO: 10.8 FL (ref 8.1–13.5)
RBC # BLD: 4.15 M/UL (ref 3.95–5.11)
RBC # BLD: NORMAL 10*6/UL
SEG NEUTROPHILS: 63 % (ref 34–64)
SEGMENTED NEUTROPHILS ABSOLUTE COUNT: 5.84 K/UL (ref 1.8–8)
TOTAL PROTEIN: 7 G/DL (ref 6.4–8.3)
TRIGLYCERIDE, FASTING: 55 MG/DL
VITAMIN B-12: 417 PG/ML (ref 232–1245)
VITAMIN D 25-HYDROXY: 47.9 NG/ML (ref 30–100)
VLDLC SERPL CALC-MCNC: NORMAL MG/DL (ref 1–30)
WBC # BLD: 9.4 K/UL (ref 4.5–13.5)
WBC # BLD: NORMAL 10*3/UL

## 2021-06-13 PROCEDURE — 82607 VITAMIN B-12: CPT

## 2021-06-13 PROCEDURE — 82306 VITAMIN D 25 HYDROXY: CPT

## 2021-06-13 PROCEDURE — 36415 COLL VENOUS BLD VENIPUNCTURE: CPT

## 2021-06-13 PROCEDURE — 80076 HEPATIC FUNCTION PANEL: CPT

## 2021-06-13 PROCEDURE — 85025 COMPLETE CBC W/AUTO DIFF WBC: CPT

## 2021-06-13 PROCEDURE — 83036 HEMOGLOBIN GLYCOSYLATED A1C: CPT

## 2021-06-13 PROCEDURE — 80061 LIPID PANEL: CPT

## 2021-06-13 PROCEDURE — 83735 ASSAY OF MAGNESIUM: CPT

## 2021-07-01 DIAGNOSIS — G47.00 INSOMNIA, UNSPECIFIED TYPE: ICD-10-CM

## 2021-07-01 RX ORDER — ZOLPIDEM TARTRATE 10 MG/1
10 TABLET ORAL NIGHTLY PRN
Qty: 30 TABLET | Refills: 0 | Status: SHIPPED | OUTPATIENT
Start: 2021-07-01 | End: 2021-07-15

## 2021-07-15 ENCOUNTER — OFFICE VISIT (OUTPATIENT)
Dept: PRIMARY CARE CLINIC | Age: 21
End: 2021-07-15
Payer: COMMERCIAL

## 2021-07-15 VITALS — HEART RATE: 92 BPM | OXYGEN SATURATION: 98 % | SYSTOLIC BLOOD PRESSURE: 112 MMHG | DIASTOLIC BLOOD PRESSURE: 78 MMHG

## 2021-07-15 DIAGNOSIS — F84.0 AUTISM SPECTRUM DISORDER: ICD-10-CM

## 2021-07-15 DIAGNOSIS — L60.0 INGROWN TOENAIL: ICD-10-CM

## 2021-07-15 DIAGNOSIS — Z13.9 ENCOUNTER FOR SCREENING INVOLVING SOCIAL DETERMINANTS OF HEALTH (SDOH): ICD-10-CM

## 2021-07-15 DIAGNOSIS — G47.00 INSOMNIA, UNSPECIFIED TYPE: Primary | ICD-10-CM

## 2021-07-15 DIAGNOSIS — F41.9 ANXIETY: ICD-10-CM

## 2021-07-15 PROCEDURE — 99214 OFFICE O/P EST MOD 30 MIN: CPT | Performed by: FAMILY MEDICINE

## 2021-07-15 PROCEDURE — G8427 DOCREV CUR MEDS BY ELIG CLIN: HCPCS | Performed by: FAMILY MEDICINE

## 2021-07-15 PROCEDURE — 1036F TOBACCO NON-USER: CPT | Performed by: FAMILY MEDICINE

## 2021-07-15 PROCEDURE — G8420 CALC BMI NORM PARAMETERS: HCPCS | Performed by: FAMILY MEDICINE

## 2021-07-15 RX ORDER — ONDANSETRON 8 MG/1
8 TABLET, ORALLY DISINTEGRATING ORAL EVERY 12 HOURS PRN
Qty: 30 TABLET | Refills: 2 | Status: SHIPPED | OUTPATIENT
Start: 2021-07-15 | End: 2021-09-25 | Stop reason: SDUPTHER

## 2021-07-15 RX ORDER — NAPROXEN 500 MG/1
500 TABLET ORAL 2 TIMES DAILY WITH MEALS
Qty: 30 TABLET | Refills: 0 | Status: SHIPPED | OUTPATIENT
Start: 2021-07-15 | End: 2022-02-07

## 2021-07-15 RX ORDER — ZOLPIDEM TARTRATE 12.5 MG/1
12.5 TABLET, FILM COATED, EXTENDED RELEASE ORAL NIGHTLY PRN
Qty: 14 TABLET | Refills: 0 | Status: SHIPPED | OUTPATIENT
Start: 2021-07-15 | End: 2021-07-29

## 2021-07-15 SDOH — ECONOMIC STABILITY: FOOD INSECURITY: WITHIN THE PAST 12 MONTHS, YOU WORRIED THAT YOUR FOOD WOULD RUN OUT BEFORE YOU GOT MONEY TO BUY MORE.: SOMETIMES TRUE

## 2021-07-15 SDOH — ECONOMIC STABILITY: FOOD INSECURITY: WITHIN THE PAST 12 MONTHS, THE FOOD YOU BOUGHT JUST DIDN'T LAST AND YOU DIDN'T HAVE MONEY TO GET MORE.: SOMETIMES TRUE

## 2021-07-15 ASSESSMENT — SOCIAL DETERMINANTS OF HEALTH (SDOH): HOW HARD IS IT FOR YOU TO PAY FOR THE VERY BASICS LIKE FOOD, HOUSING, MEDICAL CARE, AND HEATING?: SOMEWHAT HARD

## 2021-07-15 NOTE — PROGRESS NOTES
[facility] PRIMARY CARE  [address]   [address]  [address] [address]  Dept: [phone]  Dept Fax: [phone]    Tremayne Loco is a 21 y.o. female who presents today for her medical conditions/complaints as noted below. Tremayne Loco is c/o of  Chief Complaint   Patient presents with    Annual Exam     3mo f/u         HPI:     HPI     Pt here today  For follow up on chronic conditions    Insomnia: States she has been taking two tablets of ambien to sleep, and has run out. I discussed with her she can not take that high of a dose as it is above the prescribed amount. She is following with psychiatry , currently on seroquel. It has helped, but does not make her sleepy. She also had surgery for ingrown toenails as well by podiatry recently as well. She had some nausea after surgery and states zofran was helpful so she would like to have refill in case needed. Has hx of GERD in past as well and had egd and colonoscopy in past as well per mom. Hemoglobin A1C (%)   Date Value   06/13/2021 4.8             ( goal A1C is < 7)   No results found for: LABMICR  LDL Cholesterol (mg/dL)   Date Value   06/13/2021 77       (goal LDL is <100)   AST (U/L)   Date Value   06/13/2021 13     ALT (U/L)   Date Value   06/13/2021 10     BP Readings from Last 3 Encounters:   07/15/21 112/78   05/07/21 105/67   05/07/21 (!) 96/47          (goal 120/80)    Past Medical History:   Diagnosis Date    Acid reflux     occas    Autism     Autism     Back pain 2021    left shoulder/ back pain.   Physical therapy    Insomnia     Anxiety    PTSD (post-traumatic stress disorder)       Past Surgical History:   Procedure Laterality Date    COLONOSCOPY      CYST REMOVAL Right     2012    FINGER NAIL SURGERY Bilateral 5/7/2021    BILATERAL HALLUX PNA - PHENOL performed by Huma Ferguson DPM at [address] Bilateral 2020    in grown toe nail    FRENULECTOMY  2012    UPPER GASTROINTESTINAL ENDOSCOPY         Family History   Problem Relation Age of Onset    Graves Disease Mother        Social History     Tobacco Use    Smoking status: Never Smoker    Smokeless tobacco: Never Used   Substance Use Topics    Alcohol use: Never      Current Outpatient Medications   Medication Sig Dispense Refill    naproxen (NAPROSYN) 500 MG tablet Take 1 tablet by mouth 2 times daily (with meals) As needed for pain 30 tablet 0    zolpidem (AMBIEN CR) 12.5 MG extended release tablet Take 1 tablet by mouth nightly as needed for Sleep for up to 14 days. 14 tablet 0    ondansetron (ZOFRAN ODT) 8 MG TBDP disintegrating tablet Place 1 tablet under the tongue every 12 hours as needed for Nausea or Vomiting 30 tablet 2    QUEtiapine (SEROQUEL) 50 MG tablet take 1 tablet by mouth once daily at bedtime for 1 dose      QUEtiapine (SEROQUEL) 100 MG tablet take 1 tablet by mouth AT NIGHT for 1 day then 2 tablets AT NIGHT. Christen Dolan .  (REFER TO PRESCRIPTION NOTES).  L-Methylfolate 15 MG TABS take 1 tablet by mouth every morning      ondansetron (ZOFRAN-ODT) 4 MG disintegrating tablet Take 1 tablet by mouth 3 times daily as needed for Nausea or Vomiting 21 tablet 0    tiZANidine (ZANAFLEX) 2 MG tablet Take 1 tablet by mouth 2 times daily as needed (pain) 60 tablet 3    Norgestim-Eth Estrad Triphasic (TRI-SPRINTEC) 0.18/0.215/0.25 MG-35 MCG TABS Take 1 tablet by mouth daily       No current facility-administered medications for this visit.      No Known Allergies    Health Maintenance   Topic Date Due    Hepatitis C screen  Never done    DTaP/Tdap/Td vaccine (6 - Tdap) 07/31/2011    COVID-19 Vaccine (1) Never done    HIV screen  Never done    Chlamydia screen  Never done    Flu vaccine (1) 09/01/2021    Hepatitis A vaccine  Completed    Hepatitis B vaccine  Completed    Hib vaccine  Completed    HPV vaccine  Completed    Varicella vaccine  Completed    Meningococcal (ACWY) vaccine  Completed    Pneumococcal 0-64 years Vaccine  Aged Out       Subjective:      Review of Systems   Constitutional: Negative for chills and fever. Respiratory: Negative for shortness of breath. Gastrointestinal: Positive for nausea (occasional). Negative for vomiting. Psychiatric/Behavioral: Positive for sleep disturbance. Objective:     Physical Exam  Constitutional:       Appearance: She is well-developed. HENT:      Head: Normocephalic and atraumatic. Eyes:      Conjunctiva/sclera: Conjunctivae normal.      Pupils: Pupils are equal, round, and reactive to light. Cardiovascular:      Rate and Rhythm: Normal rate and regular rhythm. Heart sounds: Normal heart sounds. Pulmonary:      Effort: Pulmonary effort is normal.      Breath sounds: Normal breath sounds. Musculoskeletal:      Cervical back: Neck supple. Skin:     General: Skin is warm and dry. Neurological:      Mental Status: She is alert and oriented to person, place, and time. Psychiatric:         Behavior: Behavior normal.         Thought Content: Thought content normal.       /78   Pulse 92   SpO2 98%     Assessment:      1. Insomnia, unspecified type  - discussed with pt that she can not take more than prescribed as the dose of 20 mg is very high. We can try 12.5 ER to see if this works better, but discussed she cannot double on this dose as it is the max dose and it would not be safe. If she does , I will not be able to prescribe it for her anymore. If this does not work we can consider trying lunesta 1 mg instead. Consider sleep specialist in future if not improving.   - zolpidem (AMBIEN CR) 12.5 MG extended release tablet; Take 1 tablet by mouth nightly as needed for Sleep for up to 14 days. Dispense: 14 tablet; Refill: 0    2. Ingrown toenail  - had surgery recently, following with podiatry. 3. Autism spectrum disorder    4. Anxiety  - following with pschiatry    5.  [facility]  - [facility] Referral for Social Determinants of Health           Plan:      Return in about 3 months (around 10/15/2021) for follow up. Orders Placed This Encounter   Procedures   [facility] Referral for Social Determinants of Health     Referral Priority:   Routine     Referral Type: Other     Referral Reason:   Specialty Services Required     Requested Specialty:   Licensed Clinical      Number of Visits Requested:   1     Orders Placed This Encounter   Medications    naproxen (NAPROSYN) 500 MG tablet     Sig: Take 1 tablet by mouth 2 times daily (with meals) As needed for pain     Dispense:  30 tablet     Refill:  0    zolpidem (AMBIEN CR) 12.5 MG extended release tablet     Sig: Take 1 tablet by mouth nightly as needed for Sleep for up to 14 days. Dispense:  14 tablet     Refill:  0    ondansetron (ZOFRAN ODT) 8 MG TBDP disintegrating tablet     Sig: Place 1 tablet under the tongue every 12 hours as needed for Nausea or Vomiting     Dispense:  30 tablet     Refill:  2        Patient given educational materials - see patient instructions. Discussed use, benefit, and side effects of prescribed medications. All patient questions answered. Pt voiced understanding. Reviewed healthmaintenance. Instructed to continue current medications, diet and exercise. Patient agreed with treatment plan. Follow up as directed.      Electronically signed by Eric Tate DO on 7/18/2021 at 9:50 PM

## 2021-07-18 ASSESSMENT — ENCOUNTER SYMPTOMS
VOMITING: 0
SHORTNESS OF BREATH: 0
NAUSEA: 1

## 2021-07-20 RX ORDER — NORGESTIMATE AND ETHINYL ESTRADIOL 7DAYSX3 28
1 KIT ORAL DAILY
Qty: 28 TABLET | Refills: 5 | Status: SHIPPED | OUTPATIENT
Start: 2021-07-20 | End: 2021-08-12 | Stop reason: SDUPTHER

## 2021-07-22 ENCOUNTER — TELEPHONE (OUTPATIENT)
Dept: FAMILY MEDICINE CLINIC | Age: 21
End: 2021-07-22

## 2021-07-28 ENCOUNTER — TELEPHONE (OUTPATIENT)
Dept: FAMILY MEDICINE CLINIC | Age: 21
End: 2021-07-28

## 2021-08-05 ENCOUNTER — OFFICE VISIT (OUTPATIENT)
Dept: PRIMARY CARE CLINIC | Age: 21
End: 2021-08-05
Payer: COMMERCIAL

## 2021-08-05 VITALS — OXYGEN SATURATION: 98 % | HEART RATE: 89 BPM | SYSTOLIC BLOOD PRESSURE: 126 MMHG | DIASTOLIC BLOOD PRESSURE: 82 MMHG

## 2021-08-05 DIAGNOSIS — F41.9 ANXIETY: ICD-10-CM

## 2021-08-05 DIAGNOSIS — F84.0 AUTISM SPECTRUM DISORDER: ICD-10-CM

## 2021-08-05 DIAGNOSIS — Z11.1 VISIT FOR MANTOUX TEST: ICD-10-CM

## 2021-08-05 DIAGNOSIS — Z00.00 HEALTHCARE MAINTENANCE: Primary | ICD-10-CM

## 2021-08-05 PROCEDURE — 99395 PREV VISIT EST AGE 18-39: CPT | Performed by: FAMILY MEDICINE

## 2021-08-05 PROCEDURE — 86580 TB INTRADERMAL TEST: CPT | Performed by: FAMILY MEDICINE

## 2021-08-05 ASSESSMENT — ENCOUNTER SYMPTOMS
VOMITING: 0
SHORTNESS OF BREATH: 0
NAUSEA: 0

## 2021-08-05 NOTE — PROGRESS NOTES
[facility] PRIMARY CARE  [address]   [address]  [address] [address]  Dept: [phone]  Dept Fax: [phone]    Solomon Klein is a 24 y.o. female who presents today for her medical conditions/complaints as noted below. Solomon Klein is c/o of  Chief Complaint   Patient presents with    Annual Exam     paperwork for vocational program    PPD Placement         HPI:     HPI      Here for physical will be doing a vocational dayhab program.  Hx of autism and anxiety. Doing well with therapy and psychiatrist.     She is doing well, does not some back issues but improving. Sleep has been getting better, currently on seroquel from psychiatry and not taking the ambien anymore. Hemoglobin A1C (%)   Date Value   06/13/2021 4.8             ( goal A1C is < 7)   No results found for: LABMICR  LDL Cholesterol (mg/dL)   Date Value   06/13/2021 77       (goal LDL is <100)   AST (U/L)   Date Value   06/13/2021 13     ALT (U/L)   Date Value   06/13/2021 10     BP Readings from Last 3 Encounters:   08/05/21 126/82   07/15/21 112/78   05/07/21 105/67          (goal 120/80)    Past Medical History:   Diagnosis Date    Acid reflux     occas    Autism     Autism     Back pain 2021    left shoulder/ back pain.   Physical therapy    Insomnia     Anxiety    PTSD (post-traumatic stress disorder)       Past Surgical History:   Procedure Laterality Date    COLONOSCOPY      CYST REMOVAL Right     2012    FINGER NAIL SURGERY Bilateral 5/7/2021    BILATERAL HALLUX PNA - PHENOL performed by Alphonse Amaya DPM at [address] Bilateral 2020    in grown toe nail    FRENULECTOMY  2012    UPPER GASTROINTESTINAL ENDOSCOPY         Family History   Problem Relation Age of Onset    Graves Disease Mother        Social History     Tobacco Use    Smoking status: Never Smoker    Smokeless tobacco: Never Used   Substance Use Topics    Alcohol use: Never      Current Outpatient Medications   Medication Sig Dispense Refill    Norgestim-Eth Estrad Triphasic (TRI-SPRINTEC) 0.18/0.215/0.25 MG-35 MCG TABS Take 1 tablet by mouth daily 28 tablet 5    naproxen (NAPROSYN) 500 MG tablet Take 1 tablet by mouth 2 times daily (with meals) As needed for pain 30 tablet 0    ondansetron (ZOFRAN ODT) 8 MG TBDP disintegrating tablet Place 1 tablet under the tongue every 12 hours as needed for Nausea or Vomiting 30 tablet 2    QUEtiapine (SEROQUEL) 50 MG tablet take 1 tablet by mouth once daily at bedtime for 1 dose      QUEtiapine (SEROQUEL) 100 MG tablet 600mg at night, 200mg in the am       No current facility-administered medications for this visit. No Known Allergies    Health Maintenance   Topic Date Due    Hepatitis C screen  Never done    DTaP/Tdap/Td vaccine (6 - Tdap) 07/31/2011    COVID-19 Vaccine (1) Never done    HIV screen  Never done    Chlamydia screen  Never done    Cervical cancer screen  07/31/2021    Flu vaccine (1) 09/01/2021    Hepatitis A vaccine  Completed    Hepatitis B vaccine  Completed    Hib vaccine  Completed    HPV vaccine  Completed    Varicella vaccine  Completed    Meningococcal (ACWY) vaccine  Completed    Pneumococcal 0-64 years Vaccine  Aged Out       Subjective:      Review of Systems   Constitutional: Negative for chills and fever. Respiratory: Negative for shortness of breath. Cardiovascular: Negative for chest pain. Gastrointestinal: Negative for nausea and vomiting. Musculoskeletal:        Back pain improving       Objective:     Physical Exam  Constitutional:       Appearance: She is well-developed. HENT:      Head: Normocephalic and atraumatic. Right Ear: Tympanic membrane, ear canal and external ear normal.      Left Ear: Tympanic membrane, ear canal and external ear normal.      Mouth/Throat:      Mouth: Mucous membranes are moist.      Pharynx: Oropharynx is clear. No oropharyngeal exudate.    Eyes:      Conjunctiva/sclera: Conjunctivae normal.      Pupils: Pupils are equal, round, and reactive to light. Cardiovascular:      Rate and Rhythm: Normal rate and regular rhythm. Heart sounds: Normal heart sounds. Pulmonary:      Effort: Pulmonary effort is normal.      Breath sounds: Normal breath sounds. Abdominal:      General: Bowel sounds are normal. There is no distension. Palpations: Abdomen is soft. Tenderness: There is no abdominal tenderness. Musculoskeletal:      Cervical back: Neck supple. Right lower leg: No edema. Left lower leg: No edema. Comments: 5/5 UE and LE strength   Lymphadenopathy:      Cervical: No cervical adenopathy. Skin:     General: Skin is warm and dry. Neurological:      Mental Status: She is alert and oriented to person, place, and time. Psychiatric:         Mood and Affect: Mood normal.         Behavior: Behavior normal.         Thought Content: Thought content normal.       /82   Pulse 89   SpO2 98%     Assessment:      1. Healthcare maintenance  - paperwork/physical filled out for vocational day program. She is looking forward to start this program.     2. Visit for Mantoux test  - Mantoux testing    3. Autism spectrum disorder  - will be going to a vocational day program.     4. Anxiety  - following with psychiatry and therapist.        Plan:      Return in about 3 months (around 11/5/2021) for follow up. Orders Placed This Encounter   Procedures    Mantoux testing     No orders of the defined types were placed in this encounter. Patient given educational materials - see patient instructions. Discussed use, benefit, and side effects of prescribed medications. All patient questions answered. Pt voiced understanding. Reviewed healthmaintenance. Instructed to continue current medications, diet and exercise. Patient agreed with treatment plan. Follow up as directed.      Electronically signed by Antonio Cowan DO on 8/5/2021 at 2:50 PM

## 2021-08-06 PROBLEM — F84.0 AUTISTIC DISORDER OF CHILDHOOD ONSET: Status: ACTIVE | Noted: 2018-02-06

## 2021-08-06 PROBLEM — F41.9 ANXIETY: Status: ACTIVE | Noted: 2018-02-06

## 2021-08-06 PROBLEM — R03.0 ELEVATED BLOOD-PRESSURE READING WITHOUT DIAGNOSIS OF HYPERTENSION: Status: ACTIVE | Noted: 2018-02-06

## 2021-08-06 PROBLEM — F90.9 ATTENTION DEFICIT HYPERACTIVITY DISORDER: Status: ACTIVE | Noted: 2018-02-06

## 2021-08-06 PROBLEM — F43.21 ADJUSTMENT DISORDER WITH DEPRESSED MOOD: Status: ACTIVE | Noted: 2018-02-06

## 2021-08-06 PROBLEM — N94.6 DYSMENORRHEA: Status: ACTIVE | Noted: 2018-02-06

## 2021-08-06 PROBLEM — E34.9 DISORDER OF ENDOCRINE SYSTEM: Status: ACTIVE | Noted: 2018-02-06

## 2021-08-07 ENCOUNTER — NURSE ONLY (OUTPATIENT)
Dept: FAMILY MEDICINE CLINIC | Age: 21
End: 2021-08-07

## 2021-08-16 NOTE — FLOWSHEET NOTE
[address]  [address] [place] [address].  Phone: [phone]  Fax: [phone]    PHYSICAL THERAPY DISCHARGE SUMMARY    Date: 2021  Patient Name: Sary Mir        MRN: [id number]     Acct#:   : 2000  (24 y.o.)    Physician: [address] [place] [address] Road: Rosario Becker              Eligibility Status:  Eligible     Secondary Insurance (if applicable)               Eligibility Status: n/a  DOS:   # of visits allowed/remaining: unlimited  Source: Phone  Spoke Devikaholli Lorna  Reference: [id number]  Medical Diagnosis: L upper back, L hip pain                         Rehab Codes: M54.9, M25.552  Onset Date:         Date of Initial Eval: 21  Date of Final Treatment: 21  Total number of visits: 7    Discharge Status:  [ ] Patient recovered from condition. Treatment Goals were met. [ ] Patient received maximum benefit. No further therapy indicated at this time. [ ] Patient demonstrated improvement from condition with          of           goals met. [ ] Patient to continue exercises/home instructions independently. [ ] Therapy interrupted due to:  [x] Patient has two or more no-shows/cancellations and has been discontinued per our no show/cancellation policy. [ ] Patient has completed their prescribed number of treatment sessions. [ ] Other:      Pain level at Evaluation was    3    /10 and at Discharge was     5   /10. [ ] Patient returned to work. [ ] Patient demonstrated improved level of function. [ ] Patient has returned to previous functional level. [x] Patients current status unknown due to no-shows  [ ] Other:     Recommendations/Comments: As of last visit pt not sig improving, but pt did not return for further PT.      Treatment Included:  [x] Therapeutic Exercise  [ x ] Manual Therapy  [  ] Hot/Cold Pack  [  Eugena Leonides  [  ] Elec-Stim    [  ] Iontophoresis [  ]Aquatics [  ]  Therapeutic Activity   [  ] Neuro Re-Education  [  ] Gait    [  ] Massage  [  ] Traction    Thank you for the patient referral to Physical Therapy.  Please feel free to contact me with any questions or concerns regarding this patient's care.    ______________________________________  Mannie Julio, PT   PT ATC    Date: 8/16/2021

## 2021-08-26 ENCOUNTER — OFFICE VISIT (OUTPATIENT)
Dept: PODIATRY | Age: 21
End: 2021-08-26
Payer: COMMERCIAL

## 2021-08-26 VITALS — HEIGHT: 70 IN | BODY MASS INDEX: 22.9 KG/M2 | WEIGHT: 160 LBS

## 2021-08-26 DIAGNOSIS — M79.675 PAIN IN TOES OF BOTH FEET: Primary | ICD-10-CM

## 2021-08-26 DIAGNOSIS — M79.674 PAIN IN TOES OF BOTH FEET: Primary | ICD-10-CM

## 2021-08-26 PROCEDURE — 1036F TOBACCO NON-USER: CPT | Performed by: PODIATRIST

## 2021-08-26 PROCEDURE — 99213 OFFICE O/P EST LOW 20 MIN: CPT | Performed by: PODIATRIST

## 2021-08-26 PROCEDURE — G8420 CALC BMI NORM PARAMETERS: HCPCS | Performed by: PODIATRIST

## 2021-08-26 PROCEDURE — G8427 DOCREV CUR MEDS BY ELIG CLIN: HCPCS | Performed by: PODIATRIST

## 2021-09-25 RX ORDER — ONDANSETRON 8 MG/1
8 TABLET, ORALLY DISINTEGRATING ORAL EVERY 12 HOURS PRN
Qty: 30 TABLET | Refills: 2 | Status: SHIPPED | OUTPATIENT
Start: 2021-09-25

## 2021-10-18 ENCOUNTER — HOSPITAL ENCOUNTER (OUTPATIENT)
Age: 21
Setting detail: SPECIMEN
Discharge: HOME | End: 2021-10-18
Payer: COMMERCIAL

## 2021-10-18 ENCOUNTER — OFFICE VISIT (OUTPATIENT)
Dept: OBGYN CLINIC | Age: 21
End: 2021-10-18
Payer: COMMERCIAL

## 2021-10-18 ENCOUNTER — OFFICE VISIT (OUTPATIENT)
Dept: PRIMARY CARE CLINIC | Age: 21
End: 2021-10-18
Payer: COMMERCIAL

## 2021-10-18 VITALS
RESPIRATION RATE: 16 BRPM | SYSTOLIC BLOOD PRESSURE: 112 MMHG | WEIGHT: 150 LBS | BODY MASS INDEX: 21.52 KG/M2 | DIASTOLIC BLOOD PRESSURE: 64 MMHG

## 2021-10-18 VITALS
BODY MASS INDEX: 21.47 KG/M2 | RESPIRATION RATE: 16 BRPM | DIASTOLIC BLOOD PRESSURE: 68 MMHG | OXYGEN SATURATION: 99 % | HEIGHT: 70 IN | SYSTOLIC BLOOD PRESSURE: 116 MMHG | HEART RATE: 68 BPM | WEIGHT: 150 LBS

## 2021-10-18 DIAGNOSIS — N89.8 VAGINAL DISCHARGE: Primary | ICD-10-CM

## 2021-10-18 DIAGNOSIS — N89.8 VAGINAL IRRITATION: ICD-10-CM

## 2021-10-18 DIAGNOSIS — F84.0 AUTISM SPECTRUM DISORDER: Primary | ICD-10-CM

## 2021-10-18 DIAGNOSIS — F41.9 ANXIETY: ICD-10-CM

## 2021-10-18 DIAGNOSIS — N89.8 VAGINAL DISCHARGE: ICD-10-CM

## 2021-10-18 DIAGNOSIS — N89.8 VAGINAL ITCHING: ICD-10-CM

## 2021-10-18 PROCEDURE — 99202 OFFICE O/P NEW SF 15 MIN: CPT | Performed by: OBSTETRICS & GYNECOLOGY

## 2021-10-18 PROCEDURE — G8420 CALC BMI NORM PARAMETERS: HCPCS | Performed by: OBSTETRICS & GYNECOLOGY

## 2021-10-18 PROCEDURE — 1036F TOBACCO NON-USER: CPT | Performed by: FAMILY MEDICINE

## 2021-10-18 PROCEDURE — G8420 CALC BMI NORM PARAMETERS: HCPCS | Performed by: FAMILY MEDICINE

## 2021-10-18 PROCEDURE — G8427 DOCREV CUR MEDS BY ELIG CLIN: HCPCS | Performed by: FAMILY MEDICINE

## 2021-10-18 PROCEDURE — 1036F TOBACCO NON-USER: CPT | Performed by: OBSTETRICS & GYNECOLOGY

## 2021-10-18 PROCEDURE — G8428 CUR MEDS NOT DOCUMENT: HCPCS | Performed by: OBSTETRICS & GYNECOLOGY

## 2021-10-18 PROCEDURE — G8484 FLU IMMUNIZE NO ADMIN: HCPCS | Performed by: FAMILY MEDICINE

## 2021-10-18 PROCEDURE — G8484 FLU IMMUNIZE NO ADMIN: HCPCS | Performed by: OBSTETRICS & GYNECOLOGY

## 2021-10-18 PROCEDURE — 99213 OFFICE O/P EST LOW 20 MIN: CPT | Performed by: FAMILY MEDICINE

## 2021-10-18 ASSESSMENT — ENCOUNTER SYMPTOMS
SHORTNESS OF BREATH: 0
NAUSEA: 0
VOMITING: 0

## 2021-10-18 NOTE — PROGRESS NOTES
[facility] PRIMARY CARE  [address]   [address]  [address] [address]  Dept: [phone]  Dept Fax: [phone]    Argelia Alvares is a 24 y.o. female who presents today for her medical conditions/complaints as noted below. Argelia Alvares is c/o of  Chief Complaint   Patient presents with    3 Month Follow-Up         HPI:     HPI     Pt here for follow up    Seen by obgyn- for vaginitis , had testing done today that was sent out. Has been following with h psychiatry once a month and therapist regularly  Off seroquel now- had testing for bipolar 2  And results pending. Hx of autism spectrum disorder    Hemoglobin A1C (%)   Date Value   06/13/2021 4.8             ( goal A1C is < 7)   No results found for: LABMICR  LDL Cholesterol (mg/dL)   Date Value   06/13/2021 77       (goal LDL is <100)   AST (U/L)   Date Value   06/13/2021 13     ALT (U/L)   Date Value   06/13/2021 10     BP Readings from Last 3 Encounters:   10/18/21 116/68   10/18/21 112/64   08/05/21 126/82          (goal 120/80)    Past Medical History:   Diagnosis Date    Acid reflux     occas    Autism     Autism     Back pain 2021    left shoulder/ back pain.   Physical therapy    Insomnia     Anxiety    PTSD (post-traumatic stress disorder)       Past Surgical History:   Procedure Laterality Date    COLONOSCOPY      CYST REMOVAL Right     2012    FINGER NAIL SURGERY Bilateral 5/7/2021    BILATERAL HALLUX PNA - PHENOL performed by Pretty Swan DPM at [address] Bilateral 2020    in grown toe nail    FRENULECTOMY  2012    UPPER GASTROINTESTINAL ENDOSCOPY         Family History   Problem Relation Age of Onset    Graves Disease Mother        Social History     Tobacco Use    Smoking status: Never Smoker    Smokeless tobacco: Never Used   Substance Use Topics    Alcohol use: Never      Current Outpatient Medications   Medication Sig Dispense Refill    ondansetron (ZOFRAN ODT) 8 MG TBDP disintegrating tablet Place 1 tablet under the tongue every 12 hours as needed for Nausea or Vomiting 30 tablet 2    fluconazole (DIFLUCAN) 150 MG tablet Take 1 tablet by mouth daily 2 tablet 0    diclofenac sodium (VOLTAREN) 1 % GEL Apply 4 g topically 2 times daily 4 g 2    Norgestim-Eth Estrad Triphasic (TRI-SPRINTEC) 0.18/0.215/0.25 MG-35 MCG TABS Take 1 tablet by mouth daily 84 tablet 2    topiramate (TOPAMAX) 200 MG tablet Take 200 mg by mouth nightly      naproxen (NAPROSYN) 500 MG tablet Take 1 tablet by mouth 2 times daily (with meals) As needed for pain 30 tablet 0    QUEtiapine (SEROQUEL) 50 MG tablet take 1 tablet by mouth once daily at bedtime for 1 dose      QUEtiapine (SEROQUEL) 100 MG tablet 600mg at night, 200mg in the am       No current facility-administered medications for this visit. No Known Allergies    Health Maintenance   Topic Date Due    Hepatitis C screen  Never done    DTaP/Tdap/Td vaccine (6 - Tdap) 07/31/2011    HIV screen  Never done    Chlamydia screen  Never done    Pap smear  Never done    Flu vaccine (1) 09/01/2021    Hepatitis A vaccine  Completed    Hepatitis B vaccine  Completed    Hib vaccine  Completed    HPV vaccine  Completed    Varicella vaccine  Completed    Meningococcal (ACWY) vaccine  Completed    COVID-19 Vaccine  Completed    Pneumococcal 0-64 years Vaccine  Aged Out       Subjective:      Review of Systems   Constitutional: Negative for chills and fever. Respiratory: Negative for shortness of breath. Cardiovascular: Negative for chest pain. Gastrointestinal: Negative for nausea and vomiting. Psychiatric/Behavioral: The patient is nervous/anxious. Objective:     Physical Exam  Constitutional:       Appearance: She is well-developed. HENT:      Head: Normocephalic and atraumatic.       Right Ear: External ear normal.      Left Ear: External ear normal.   Eyes:      Conjunctiva/sclera: Conjunctivae normal.      Pupils: Pupils are equal, round, and reactive to light. Cardiovascular:      Rate and Rhythm: Normal rate and regular rhythm. Heart sounds: Normal heart sounds. Pulmonary:      Effort: Pulmonary effort is normal.      Breath sounds: Normal breath sounds. Musculoskeletal:      Cervical back: Neck supple. Skin:     General: Skin is warm and dry. Neurological:      Mental Status: She is alert and oriented to person, place, and time. Psychiatric:         Mood and Affect: Mood normal.         Behavior: Behavior normal.         Thought Content: Thought content normal.       /68 (Site: Left Upper Arm, Position: Sitting, Cuff Size: Medium Adult)   Pulse 68   Resp 16   Ht 5' 10\" (1.778 m)   Wt 150 lb (68 kg)   SpO2 99%   Breastfeeding No   BMI 21.52 kg/m²     Assessment:      1. Autism spectrum disorder  - doing well, following with psychiatry for her anxiety and had testing done for possible bioplar 2 disorder. Testing pending. 2. Anxiety  - as noted above    3. Vaginal itching  - seen by obgyn today and had vaginitis swab done. Plan:      Return in about 3 months (around 1/18/2022) for follow up. No orders of the defined types were placed in this encounter. No orders of the defined types were placed in this encounter. Patient given educational materials - see patient instructions. Discussed use, benefit, and side effects of prescribed medications. All patient questions answered. Pt voiced understanding. Reviewed healthmaintenance. Instructed to continue current medications, diet and exercise. Patient agreed with treatment plan. Follow up as directed.      Electronically signed by Hamida Perez DO on 10/18/2021 at 2:48 PM

## 2021-10-19 LAB
DIRECT EXAM: NORMAL
Lab: NORMAL
SPECIMEN DESCRIPTION: NORMAL

## 2021-10-22 NOTE — PROGRESS NOTES
Mara Olvera  10/18/2021    YOB: 2000        HPI:  Sulaiman Hung is a 24 y.o. female No obstetric history on file. Patient states she has had several years of vaginal and labial irritation that comes and goes, will take diflucan for the pain and it seems to improve for a few days. States this episode of irritation started two months ago and has persisted throughout the end of the summer   Patient has no odor to discharge, but her discharge is thicker, but also states it is like water, but chunks of water - patient very inconsistent in how she describes her symptoms. States she constantly has to check her bottom for discharge and is wiping all of the time. OB History   No obstetric history on file. Past Medical History:   Diagnosis Date    Acid reflux     occas    Autism     Autism     Back pain 2021    left shoulder/ back pain.   Physical therapy    Insomnia     Anxiety    PTSD (post-traumatic stress disorder)        Past Surgical History:   Procedure Laterality Date    COLONOSCOPY      CYST REMOVAL Right     2012    FINGER NAIL SURGERY Bilateral 5/7/2021    BILATERAL HALLUX PNA - PHENOL performed by Connor Valenzuela DPM at [facility] Bilateral 2020    in grown toe nail    FRENULECTOMY  2012    UPPER GASTROINTESTINAL ENDOSCOPY         Family History   Problem Relation Age of Onset    Graves Disease Mother        Social History     Socioeconomic History    Marital status: Single     Spouse name: Not on file    Number of children: Not on file    Years of education: Not on file    Highest education level: Not on file   Occupational History    Not on file   Tobacco Use    Smoking status: Never Smoker    Smokeless tobacco: Never Used   Vaping Use    Vaping Use: Never used   Substance and Sexual Activity    Alcohol use: Never    Drug use: Yes     Types: Marijuana     Comment: Medical marijuana- 5/3/2021    Sexual activity: Not on file   Other Topics Concern    Not on file   Social History Narrative    Not on file     Social Determinants of Health     Financial Resource Strain: Medium Risk    Difficulty of Paying Living Expenses: Somewhat hard   Food Insecurity: Food Insecurity Present    Worried About Running Out of Food in the Last Year: Sometimes true    [place] of Food in the Last Year: Sometimes true   Transportation Needs:     Lack of Transportation (Medical):      Lack of Transportation (Non-Medical):    Physical Activity:     Days of Exercise per Week:     Minutes of Exercise per Session:    Stress:     Feeling of Stress :    Social Connections:     Frequency of Communication with Friends and Family:     Frequency of Social Gatherings with Friends and Family:     Attends [employer] Services:     Active Member of Clubs or Organizations:     Attends Club or Organization Meetings:     Marital Status:    Intimate Partner Violence:     Fear of Current or Ex-Partner:     Emotionally Abused:     Physically Abused:     Sexually Abused:          MEDICATIONS:  Current Outpatient Medications   Medication Sig Dispense Refill    ondansetron (ZOFRAN ODT) 8 MG TBDP disintegrating tablet Place 1 tablet under the tongue every 12 hours as needed for Nausea or Vomiting 30 tablet 2    fluconazole (DIFLUCAN) 150 MG tablet Take 1 tablet by mouth daily 2 tablet 0    diclofenac sodium (VOLTAREN) 1 % GEL Apply 4 g topically 2 times daily 4 g 2    Norgestim-Eth Estrad Triphasic (TRI-SPRINTEC) 0.18/0.215/0.25 MG-35 MCG TABS Take 1 tablet by mouth daily 84 tablet 2    topiramate (TOPAMAX) 200 MG tablet Take 200 mg by mouth nightly      naproxen (NAPROSYN) 500 MG tablet Take 1 tablet by mouth 2 times daily (with meals) As needed for pain 30 tablet 0    QUEtiapine (SEROQUEL) 50 MG tablet take 1 tablet by mouth once daily at bedtime for 1 dose      QUEtiapine (SEROQUEL) 100 MG tablet 600mg at night, 200mg in the am       No current facility-administered medications for this visit. ALLERGIES:  Allergies as of 10/18/2021    (No Known Allergies)         Review Of Systems (11 point):  Constitutional: No fever, chills or malaise; No weight change or fatigue  Head and Eyes: No vision, Headache, Dizziness or trauma in last 12 months  ENT ROS: No hearing, Tinnitis, sinus or taste problems  Hematological and Lymphatic ROS:No Lymphoma, Von Willebrand's, Hemophillia or Bleeding History  Psych ROS: No Depression, Homicidal thoughts,suicidal thoughts, or anxiety  Breast ROS: No prior breast abnormalities or lumps  Respiratory ROS: No SOB, Pneumoniae,Cough, or Pulmonary Embolism History  Cardiovascular ROS: No Chest Pain with Exertion, Palpitations, Syncope, Edema, Arrhythmia  Gastrointestinal ROS: No Indigestion, Heartburn, Nausea, vomiting, Diarrhea, Constipation,or Bowel Changes; No Bloody Stools or melena  Genito-Urinary ROS: No Dysuria, Hematuria or Nocturia. No Urinary Incontinence, + Vaginal Discharge, + vaginal and labial irritation   Musculoskeletal ROS: No Arthralgia, Arthritis,Gout,Osteoporosis or Rheumatism  Neurological ROS: No CVA, Migraines, Epilepsy, Seizure Hx, or Limb Weakness  Dermatological ROS: No Rash, Itching, Hives, Mole Changes or Cancer          Blood pressure 112/64, resp. rate 16, weight 150 lb (68 kg), not currently breastfeeding. Abdomen: Soft non-tender; good bowel sounds. No guarding, rebound or rigidity. No CVA tenderness bilaterally. Extremities: No calf tenderness, DTR 2/4, and No edema bilaterally    Pelvic: Vulva and vagina appear normal. Bimanual exam reveals normal uterus and adnexa. There is absolutely NO abnormality to external genital area, no excoriation, no discoloration and completely normal discharge noted at time of exam.       Assessment:   Diagnosis Orders   1. Vaginal discharge  VAGINITIS DNA PROBE   2.  Vaginal irritation  VAGINITIS DNA PROBE     Patient Active Problem List    Diagnosis

## 2021-12-28 ENCOUNTER — OFFICE VISIT (OUTPATIENT)
Dept: FAMILY MEDICINE CLINIC | Age: 21
End: 2021-12-28
Payer: COMMERCIAL

## 2021-12-28 VITALS
DIASTOLIC BLOOD PRESSURE: 68 MMHG | OXYGEN SATURATION: 97 % | BODY MASS INDEX: 22.33 KG/M2 | HEART RATE: 88 BPM | HEIGHT: 70 IN | TEMPERATURE: 98.4 F | WEIGHT: 156 LBS | SYSTOLIC BLOOD PRESSURE: 104 MMHG

## 2021-12-28 DIAGNOSIS — M77.42 METATARSALGIA OF LEFT FOOT: Primary | ICD-10-CM

## 2021-12-28 DIAGNOSIS — M79.672 ACUTE FOOT PAIN, LEFT: ICD-10-CM

## 2021-12-28 PROCEDURE — 1036F TOBACCO NON-USER: CPT

## 2021-12-28 PROCEDURE — 99213 OFFICE O/P EST LOW 20 MIN: CPT

## 2021-12-28 PROCEDURE — G8420 CALC BMI NORM PARAMETERS: HCPCS

## 2021-12-28 PROCEDURE — G8484 FLU IMMUNIZE NO ADMIN: HCPCS

## 2021-12-28 PROCEDURE — G8427 DOCREV CUR MEDS BY ELIG CLIN: HCPCS

## 2021-12-28 ASSESSMENT — ENCOUNTER SYMPTOMS: COLOR CHANGE: 0

## 2021-12-28 NOTE — PATIENT INSTRUCTIONS
Imaging ordered. Use OTC NSAID as needed for pain. Follow RICE treatment. Patient Education        Foot Pain: Care Instructions  Your Care Instructions     Foot injuries that cause pain and swelling are fairly common. Almost all sports or home repair projects can cause a misstep that ends up as foot pain. Normal wear and tear, especially as you get older, also can cause foot pain. Most minor foot injuries will heal on their own, and home treatment is usually all you need to do. If you have a severe injury, you may need tests and treatment. Follow-up care is a key part of your treatment and safety. Be sure to make and go to all appointments, and call your doctor if you are having problems. It's also a good idea to know your test results and keep a list of the medicines you take. How can you care for yourself at home? · Take pain medicines exactly as directed. ? If the doctor gave you a prescription medicine for pain, take it as prescribed. ? If you are not taking a prescription pain medicine, ask your doctor if you can take an over-the-counter medicine. · Rest and protect your foot. Take a break from any activity that may cause pain. · Put ice or a cold pack on your foot for 10 to 20 minutes at a time. Put a thin cloth between the ice and your skin. · Prop up the sore foot on a pillow when you ice it or anytime you sit or lie down during the next 3 days. Try to keep it above the level of your heart. This will help reduce swelling. · Your doctor may recommend that you wrap your foot with an elastic bandage. Keep your foot wrapped for as long as your doctor advises. · If your doctor recommends crutches, use them as directed. · Wear roomy footwear. · As soon as pain and swelling end, begin gentle exercises of your foot. Your doctor can tell you which exercises will help. When should you call for help? Call 911 anytime you think you may need emergency care.  For example, call if:    · Your foot turns pale, white, blue, or cold. Call your doctor now or seek immediate medical care if:    · You cannot move or stand on your foot.     · Your foot looks twisted or out of its normal position.     · Your foot is not stable when you step down.     · You have signs of infection, such as:  ? Increased pain, swelling, warmth, or redness. ? Red streaks leading from the sore area. ? Pus draining from a place on your foot. ? A fever.     · Your foot is numb or tingly. Watch closely for changes in your health, and be sure to contact your doctor if:    · You do not get better as expected.     · You have bruises from an injury that last longer than 2 weeks. Where can you learn more? Go to https://etouches.[employer]. org and sign in to your [employer] account. Enter C166 in the [employer] box to learn more about \"Foot Pain: Care Instructions. \"     If you do not have an account, please click on the \"Sign Up Now\" link. Current as of: July 1, 2021               Content Version: 13.1  © 2430-2682 [employer], Inc[employer]. Care instructions adapted under license by [facility] ([facility]). If you have questions about a medical condition or this instruction, always ask your healthcare professional. [address]  any warranty or liability for your use of this information. Follow-up with Podiatry.

## 2021-12-28 NOTE — PROGRESS NOTES
[address] PRIMARY CARE  [address] [place] [address],[address]  [address] [facility]    [address] WALK-IN  [address]  [address]  [address] [address]  Dept: [phone]    Jose De Jesus Melendez is a 24 y.o. female Established patient, who presents to the walk-in clinic today with conditions/complaints as noted below:    Chief Complaint   Patient presents with    Foot Injury     patient heard a pop in left foot and now can not move toes 1 and 2, \"feels like something ripping\", hurts in arch area as well up to 1st MPJ         HPI:     Toe Pain   The incident occurred 3 to 5 days ago (about 4 days ago). The incident occurred at home. There was no injury mechanism (patient was standing in the bathroom when she felt a \"pop\"). The pain is present in the left foot (left MTP, great toe). Quality: \"ripping\" The pain is severe. The pain has been fluctuating since onset. Associated symptoms include a loss of motion, numbness and tingling. Pertinent negatives include no inability to bear weight. She reports no foreign bodies present. The symptoms are aggravated by palpation and weight bearing. She has tried nothing for the symptoms. Patient's mother states that they called to get an appointment with Podiatry, but couldn't get in for a couple of weeks. Past Medical History:   Diagnosis Date    Acid reflux     occas    Autism     Autism     Back pain 2021    left shoulder/ back pain.   Physical therapy    Insomnia     Anxiety    PTSD (post-traumatic stress disorder)        Current Outpatient Medications   Medication Sig Dispense Refill    ondansetron (ZOFRAN ODT) 8 MG TBDP disintegrating tablet Place 1 tablet under the tongue every 12 hours as needed for Nausea or Vomiting 30 tablet 2    fluconazole (DIFLUCAN) 150 MG tablet Take 1 tablet by mouth daily 2 tablet 0    diclofenac sodium (VOLTAREN) 1 % GEL Apply 4 g topically 2 times daily 4 g 2    Norgestim-Eth Estrad Triphasic (TRI-SPRINTEC) 0.18/0.215/0.25 MG-35 MCG TABS Take 1 tablet by mouth daily 84 tablet 2    topiramate (TOPAMAX) 200 MG tablet Take 200 mg by mouth nightly      naproxen (NAPROSYN) 500 MG tablet Take 1 tablet by mouth 2 times daily (with meals) As needed for pain 30 tablet 0    QUEtiapine (SEROQUEL) 50 MG tablet take 1 tablet by mouth once daily at bedtime for 1 dose      QUEtiapine (SEROQUEL) 100 MG tablet 600mg at night, 200mg in the am       No current facility-administered medications for this visit. No Known Allergies    :     Review of Systems   Constitutional: Negative for chills and fever. Musculoskeletal: Positive for arthralgias (left great toe), gait problem and myalgias. Negative for joint swelling. Skin: Negative for color change and rash. Neurological: Positive for tingling, weakness and numbness.       :     /68   Pulse 88   Temp 98.4 °F (36.9 °C) (Temporal)   Ht 5' 10\" (1.778 m)   Wt 156 lb (70.8 kg)   SpO2 97%   BMI 22.38 kg/m²     Physical Exam  Vitals reviewed. Constitutional:       General: She is not in acute distress. Appearance: Normal appearance. Cardiovascular:      Pulses:           Dorsalis pedis pulses are 2+ on the left side. Musculoskeletal:         General: No signs of injury. Left ankle: Normal. No swelling or deformity. No tenderness. Normal range of motion. Left foot: Decreased range of motion (great toe flexion/extension). Normal capillary refill. Tenderness (medial midfoot) present. No swelling, deformity, foot drop or bony tenderness. Feet:      Left foot:      Skin integrity: Skin integrity normal. No skin breakdown, erythema or warmth. Skin:     General: Skin is warm and dry. Capillary Refill: Capillary refill takes less than 2 seconds. Findings: No erythema or rash. Neurological:      Mental Status: She is alert and oriented to person, place, and time. Sensory: No sensory deficit.       Motor: No weakness. Gait: Gait abnormal.   Psychiatric:         Attention and Perception: Attention normal.         Mood and Affect: Mood is anxious. Speech: Speech normal.         Behavior: Behavior normal. Behavior is cooperative.           :          1. Metatarsalgia of left foot  2. Acute foot pain, left  -     XR FOOT LEFT (MIN 3 VIEWS); Future       :      Return if symptoms worsen or fail to improve. No orders of the defined types were placed in this encounter. Imaging ordered. Instructed to follow RICE treatment. ACE wrap applied to the left foot in office. Advised to rest the affected extremity. May apply ice/heat as needed for comfort. Recommend using an OTC NSAID as needed for pain. Follow-up with Podiatry. Patient and/or parent given educational materials - see patient instructions. Discussed use, benefit, and side effects of prescribed medications. All patient questions answered. Patient and/or parent voiced understanding.       Electronically signed by CARLOS Munoz 12/28/2021 at 6:54 PM

## 2021-12-29 ENCOUNTER — HOSPITAL ENCOUNTER (OUTPATIENT)
Dept: GENERAL RADIOLOGY | Age: 21
Discharge: HOME | End: 2021-12-31
Payer: COMMERCIAL

## 2021-12-29 ENCOUNTER — HOSPITAL ENCOUNTER (OUTPATIENT)
Age: 21
Discharge: HOME | End: 2021-12-31
Payer: COMMERCIAL

## 2021-12-29 DIAGNOSIS — M79.672 ACUTE FOOT PAIN, LEFT: ICD-10-CM

## 2021-12-29 PROCEDURE — 73630 X-RAY EXAM OF FOOT: CPT

## 2022-01-03 ENCOUNTER — HOSPITAL ENCOUNTER (EMERGENCY)
Age: 22
Discharge: HOME | End: 2022-01-03
Attending: EMERGENCY MEDICINE
Payer: COMMERCIAL

## 2022-01-03 ENCOUNTER — APPOINTMENT (OUTPATIENT)
Dept: GENERAL RADIOLOGY | Age: 22
End: 2022-01-03
Payer: COMMERCIAL

## 2022-01-03 VITALS
BODY MASS INDEX: 22.19 KG/M2 | RESPIRATION RATE: 26 BRPM | HEIGHT: 70 IN | HEART RATE: 66 BPM | TEMPERATURE: 99.7 F | WEIGHT: 155 LBS | OXYGEN SATURATION: 98 % | SYSTOLIC BLOOD PRESSURE: 102 MMHG | DIASTOLIC BLOOD PRESSURE: 69 MMHG

## 2022-01-03 DIAGNOSIS — R07.81 RIB PAIN: Primary | ICD-10-CM

## 2022-01-03 DIAGNOSIS — F41.0 PANIC ATTACK: ICD-10-CM

## 2022-01-03 PROCEDURE — 71045 X-RAY EXAM CHEST 1 VIEW: CPT

## 2022-01-03 PROCEDURE — 99284 EMERGENCY DEPT VISIT MOD MDM: CPT

## 2022-01-03 NOTE — ED NOTES
Bed: [facility]  Expected date:   Expected time:   Means of arrival:   Comments:  Hold ambulance     Arie Antunez, RN  01/03/22 [id number]

## 2022-01-04 NOTE — ED PROVIDER NOTES
[address] [facility] ED  [address] [facility]. [facility] [address]  Phone: [phone]  Fax: [place] [place] [address]16      Pt Name: Lana Ibarra  MRN: [id number]  [address] 2000  Date of evaluation: 1/3/2022    CHIEF COMPLAINT       Chief Complaint   Patient presents with    Panic Attack     PAtient got her COVID booster today and then this evening had an anxiety attack and has a history a PTSD       HISTORY OF PRESENT ILLNESS    Lana Ibarra is a 24 y.o. female who presents to the emergency room by ambulance after a possible anxiety attack. Patient was eating dinner she just finished when she thought something might of gotten stuck in her lungs so she started to feel short of breath and then became very anxious. In the ambulance ride over she took a deep breath and felt a pop and felt like she could breathe a lot better. On arrival she is feeling less anxious and her symptoms are almost completely resolved. She denies any chest pain or tightness currently. No fevers chills cough congestion. She also had her booster shot today around 3:00. She has a history of PTSD and autism. Denies any other symptoms. REVIEW OF SYSTEMS       Constitutional: No fevers or chills positive anxious  HEENT: No sore throat, rhinorrhea, or earache   Eyes: No blurry vision or double vision no drainage   Cardiovascular: Positive chest tightness no pain no tachycardia  Respiratory: No wheezing positive difficulty taking deep breath no cough  Gastrointestinal: No nausea, vomiting, diarrhea, constipation, or abdominal pain   : No hematuria or dysuria   Musculoskeletal: No swelling or pain   Skin: No rash   Neurological: No focal neurologic complaints, paresthesias, weakness, or headache     PAST MEDICAL HISTORY    has a past medical history of Acid reflux, Autism, Autism, Back pain, Insomnia, and PTSD (post-traumatic stress disorder).     SURGICAL HISTORY      has a past surgical history that includes Colonoscopy; Foot surgery (Bilateral, 2020); Frenulectomy (2012); cyst removal (Right); Upper gastrointestinal endoscopy; and Finger nail surgery (Bilateral, 5/7/2021). CURRENT MEDICATIONS       Previous Medications    DICLOFENAC SODIUM (VOLTAREN) 1 % GEL    Apply 4 g topically 2 times daily    FLUCONAZOLE (DIFLUCAN) 150 MG TABLET    Take 1 tablet by mouth daily    NAPROXEN (NAPROSYN) 500 MG TABLET    Take 1 tablet by mouth 2 times daily (with meals) As needed for pain    NORGESTIM-ETH ESTRAD TRIPHASIC (TRI-SPRINTEC) 0.18/0.215/0.25 MG-35 MCG TABS    Take 1 tablet by mouth daily    ONDANSETRON (ZOFRAN ODT) 8 MG TBDP DISINTEGRATING TABLET    Place 1 tablet under the tongue every 12 hours as needed for Nausea or Vomiting    QUETIAPINE (SEROQUEL) 100 MG TABLET    600mg at night, 200mg in the am    QUETIAPINE (SEROQUEL) 50 MG TABLET    take 1 tablet by mouth once daily at bedtime for 1 dose    TOPIRAMATE (TOPAMAX) 200 MG TABLET    Take 200 mg by mouth nightly       ALLERGIES     has No Known Allergies. FAMILY HISTORY     She indicated that her mother is alive. She indicated that her father is alive. family history includes Graves Disease in her mother. SOCIAL HISTORY      reports that she has never smoked. She has never used smokeless tobacco. She reports current drug use. Drug: Marijuana Thurl Cipro). She reports that she does not drink alcohol.     PHYSICAL EXAM       ED Triage Vitals [01/03/22 1900]   BP Temp Temp Source Pulse Resp SpO2 Height Weight   (!) 135/101 99.7 °F (37.6 °C) Temporal 94 26 99 % 5' 10\" (1.778 m) 155 lb (70.3 kg)     Constitutional: Alert, oriented x3, nontoxic, answering questions appropriately, acting properly for age, in no acute distress   HEENT: Extraocular muscles intact,   Neck: Trachea midline   Cardiovascular: Regular rhythm and rate no murmurs   Respiratory: Clear to auscultation bilaterally no wheezes, rhonchi, rales, no respiratory distress no tachypnea no retractions no hypoxia  Gastrointestinal: Soft, nontender, nondistended, positive bowel sounds. No rebound, rigidity, or guarding. Musculoskeletal: No extremity pain or swelling no calf tenderness or asymmetry  Neurologic: Moving all 4 extremities  Skin: Warm and dry     DIFFERENTIAL DIAGNOSIS/ MDM:     Place patient on monitor. Chest x-ray. No labs indicated at this time. Suspect anxiety reaction. DIAGNOSTIC RESULTS     EKG: All EKG's are interpreted by the Emergency Department Physician who either signs or Co-signs this chart in the absence of a cardiologist.        Not indicated unless otherwise documented above    LABS:  No results found for this visit on 01/03/22. Not indicated unless otherwise documented above    RADIOLOGY:   I reviewed the radiologist interpretations:    XR CHEST PORTABLE   Final Result      No acute intrathoracic pathology. Not indicated unless otherwise documented above    EMERGENCY DEPARTMENT COURSE:     The patient was given the following medications:  No orders of the defined types were placed in this encounter. Vitals:   -------------------------  /69   Pulse 66   Temp 99.7 °F (37.6 °C) (Temporal)   Resp 26   Ht 5' 10\" (1.778 m)   Wt 70.3 kg (155 lb)   SpO2 98%   BMI 22.24 kg/m²     8:30 PM feeling much better. No chest pain. Chest x-ray unremarkable low suspicion for anything more significant. Low suspicion for MI, pulmonary embolism or pneumothorax. Will discharge. Mom at bedside. The patient and her mom understands that at this time there is no evidence for a more malignant underlying process, but also understands that early in the process of an illness or injury, an emergency department workup can be falsely reassuring. Routine discharge counseling was given, and it is understood that worsening, changing or persistent symptoms should prompt an immediate call or follow up with their primary physician or return to the emergency department.  The

## 2022-01-10 ENCOUNTER — OFFICE VISIT (OUTPATIENT)
Dept: PODIATRY | Age: 22
End: 2022-01-10
Payer: COMMERCIAL

## 2022-01-10 VITALS — WEIGHT: 155 LBS | HEIGHT: 70 IN | RESPIRATION RATE: 16 BRPM | BODY MASS INDEX: 22.19 KG/M2

## 2022-01-10 DIAGNOSIS — M79.605 PAIN OF LEFT LOWER EXTREMITY: ICD-10-CM

## 2022-01-10 DIAGNOSIS — M72.2 PLANTAR FASCIITIS, LEFT: Primary | ICD-10-CM

## 2022-01-10 PROCEDURE — G8427 DOCREV CUR MEDS BY ELIG CLIN: HCPCS | Performed by: PODIATRIST

## 2022-01-10 PROCEDURE — G8420 CALC BMI NORM PARAMETERS: HCPCS | Performed by: PODIATRIST

## 2022-01-10 PROCEDURE — G8482 FLU IMMUNIZE ORDER/ADMIN: HCPCS | Performed by: PODIATRIST

## 2022-01-10 PROCEDURE — 99213 OFFICE O/P EST LOW 20 MIN: CPT | Performed by: PODIATRIST

## 2022-01-10 PROCEDURE — 1036F TOBACCO NON-USER: CPT | Performed by: PODIATRIST

## 2022-01-10 RX ORDER — ZOLPIDEM TARTRATE 10 MG/1
TABLET ORAL
COMMUNITY
Start: 2021-12-20 | End: 2022-09-14

## 2022-01-10 NOTE — PROGRESS NOTES
[address] [facility] PODIATRY [facility]  [address] [place] [address] [place] [address]  Dept: [phone]  Dept Fax: [phone]    RETURN PATIENT PROGRESS NOTE  Date of patient's visit: 1/10/2022  Patient's Name:  Bhupendra Colbert YOB: 2000            Patient Care Team:  Mary Santoro DO as PCP - General (Family Medicine)  Mary Santoro DO as PCP - REHABILITATION [facility] Empaneled Provider       Bhupendra Clobert 24 y.o. female that presents for follow-up of   Chief Complaint   Patient presents with    Foot Injury     x2 weeks    Foot Pain     left foot     Pt's primary care physician is Mary Santoro DO last seen 12/28/2021  Symptoms began 2 week(s) ago and are unchanged . Patient relates pain is Present to left foot arch. Pain is rated 3 out of 10 and is described as constant, mild. Treatments prior to today's visit include: none. Currently denies F/C/N/V. No Known Allergies    Past Medical History:   Diagnosis Date    Acid reflux     occas    Autism     Autism     Back pain 2021    left shoulder/ back pain. Physical therapy    Insomnia     Anxiety    PTSD (post-traumatic stress disorder)        Prior to Admission medications    Medication Sig Start Date End Date Taking? Authorizing Provider   zolpidem (AMBIEN) 10 MG tablet take 1 tablet by mouth at bedtime if needed for insomnia NOT TO B...  (REFER TO PRESCRIPTION NOTES).  12/20/21  Yes Historical Provider, MD   ondansetron (ZOFRAN ODT) 8 MG TBDP disintegrating tablet Place 1 tablet under the tongue every 12 hours as needed for Nausea or Vomiting 9/25/21  Yes Mary Santoro DO   fluconazole (DIFLUCAN) 150 MG tablet Take 1 tablet by mouth daily 9/23/21  Yes Rehana Medhkour, DO   diclofenac sodium (VOLTAREN) 1 % GEL Apply 4 g topically 2 times daily 8/26/21  Yes Demond Blush, DPM   Norgestim-Eth Estrad Triphasic (TRI-SPRINTEC) 0.18/0.215/0.25 MG-35 MCG TABS Take 1 tablet by mouth daily 8/12/21 Yes Rehana Jones, DO   topiramate (TOPAMAX) 200 MG tablet Take 200 mg by mouth nightly 7/27/21  Yes Historical Provider, MD   naproxen (NAPROSYN) 500 MG tablet Take 1 tablet by mouth 2 times daily (with meals) As needed for pain 7/15/21  Yes Alma Rosado,    QUEtiapine (SEROQUEL) 50 MG tablet take 1 tablet by mouth once daily at bedtime for 1 dose 4/20/21  Yes Historical Provider, MD   QUEtiapine (SEROQUEL) 100 MG tablet 600mg at night, 200mg in the am 4/20/21  Yes Historical Provider, MD       Review of Systems    Review of Systems:  History obtained from chart review and the patient  General ROS: negative for - chills, fatigue, fever, night sweats or weight gain  Constitutional: Negative for chills, diaphoresis, fatigue, fever and unexpected weight change. Musculoskeletal: Positive for arthralgias, gait problem and joint swelling. Neurological ROS: negative for - behavioral changes, confusion, headaches or seizures. Negative for weakness and numbness. Dermatological ROS: negative for - mole changes, rash  Cardiovascular: Negative for leg swelling. Gastrointestinal: Negative for constipation, diarrhea, nausea and vomiting. Lower Extremity Physical Examination:     Vitals:   Vitals:    01/10/22 1339   Resp: 16     General: AAO x 3 in NAD. Dermatologic Exam:  Skin lesion/ulceration Absent . Skin No rashes or nodules noted. .       Musculoskeletal:     1st MPJ ROM decreased, Bilateral.  Muscle strength 5/5, Bilateral. POP of the left plantar medial calcaneal tuberosity and arch. Pain increased with Dorsiflexion of the right and left lesser toes. Negative pain on compression of the calcaneus bilaterally Medial longitudinal arch, Bilateral WNL.   Ankle ROM WNL,Bilateral.    Dorsally contracted digits absent digits 1-5 Bilateral.     Vascular: DP and PT pulses palpable 2/4, Bilateral.  CFT <3 seconds, Bilateral.  Hair growth present to the level of the digits, Bilateral.  Edema absent, Bilateral.  Varicosities absent, Bilateral. Erythema absent, Bilateral    Neurological: Sensation intact to light touch to level of digits, Bilateral.  Protective sensation intact 10/10 sites via 5.07/10g [place]-Dom Monofilament, Bilateral.  negative Tinel's, Bilateral.  negative Valleix sign, Bilateral.      Integument: Warm, dry, supple, Bilateral.  Open lesion absent, Bilateral.  Interdigital maceration absent to web spaces 1-4, Bilateral.  Nails are normal in length, thickness and color 1-5 bilateral.  Fissures absent, Bilateral.           Asessment: Patient is a 24 y.o. female with:    Diagnosis Orders   1. Plantar fasciitis, left     2. Pain of left lower extremity           Plan: Patient examined and evaluated. Current condition and treatment options discussed in detail. Patient Instructions: Plantar Fasciitis    Plantar Fasciitis is inflammation of the long fibrous band on the bottom of the foot (plantar fascia), especially in the area of its attachment to the heel bone (calcaneus). The plantar fascia is also intimately related to the Achilles tendon and therefore stretching of the calf muscles is also important for treatment. 1. Stretching: Perform 3-4 times daily, 2-3 minutes per side      -Before getting out of bed, place a towel around the ball of your foot and       pull back, holding for 30 seconds. Repeat with other foot.      -Place a tennis ball on the floor. Roll the tennis ball under your foot for      10-15 minutes. Repeat with other foot. -Perform calf stretches: stand facing a wall with your hands on the wall,      place one leg a step behind the other. Keeping your back heel on the      floor, bend your front knee (lean into the wall), holding for 30 seconds. Repeat with other leg. 2. Icing: Perform 2-3 times daily      -Place a 12 oz plastic water bottle in the freezer. Once frozen, remove     and roll the bottle under your foot for 10-15 min. 3. Anti-inflammatory Medication      -Begin daily regimen of anti-inflammatory medication (Ibuprofen,       Naproxen, Naprosyn) as discussed during your visit. Verbal and written instructions given to patient. Contact office with any questions/problems/concerns. No orders of the defined types were placed in this encounter. No orders of the defined types were placed in this encounter. RTC in 2month(s).     1/10/2022      Electronically signed by Odin Lagos DPM on 1/10/2022 at 1:42 PM  1/10/2022

## 2022-02-07 ENCOUNTER — OFFICE VISIT (OUTPATIENT)
Dept: PRIMARY CARE CLINIC | Age: 22
End: 2022-02-07
Payer: COMMERCIAL

## 2022-02-07 VITALS
SYSTOLIC BLOOD PRESSURE: 116 MMHG | WEIGHT: 149.8 LBS | OXYGEN SATURATION: 98 % | BODY MASS INDEX: 21.49 KG/M2 | HEART RATE: 133 BPM | DIASTOLIC BLOOD PRESSURE: 76 MMHG

## 2022-02-07 DIAGNOSIS — Z83.49 FAMILY HISTORY OF GRAVES' DISEASE: ICD-10-CM

## 2022-02-07 DIAGNOSIS — F41.9 ANXIETY: ICD-10-CM

## 2022-02-07 DIAGNOSIS — M62.81 MUSCLE WEAKNESS: ICD-10-CM

## 2022-02-07 DIAGNOSIS — M79.10 MUSCLE ACHE: ICD-10-CM

## 2022-02-07 DIAGNOSIS — F84.0 AUTISM SPECTRUM DISORDER: ICD-10-CM

## 2022-02-07 DIAGNOSIS — M79.672 LEFT FOOT PAIN: Primary | ICD-10-CM

## 2022-02-07 DIAGNOSIS — F43.21 ADJUSTMENT DISORDER WITH DEPRESSED MOOD: ICD-10-CM

## 2022-02-07 DIAGNOSIS — R53.83 OTHER FATIGUE: ICD-10-CM

## 2022-02-07 DIAGNOSIS — Z82.0 FAMILY HISTORY OF MYASTHENIA GRAVIS: ICD-10-CM

## 2022-02-07 PROCEDURE — 1036F TOBACCO NON-USER: CPT | Performed by: FAMILY MEDICINE

## 2022-02-07 PROCEDURE — G8420 CALC BMI NORM PARAMETERS: HCPCS | Performed by: FAMILY MEDICINE

## 2022-02-07 PROCEDURE — G8427 DOCREV CUR MEDS BY ELIG CLIN: HCPCS | Performed by: FAMILY MEDICINE

## 2022-02-07 PROCEDURE — 99214 OFFICE O/P EST MOD 30 MIN: CPT | Performed by: FAMILY MEDICINE

## 2022-02-07 PROCEDURE — G8482 FLU IMMUNIZE ORDER/ADMIN: HCPCS | Performed by: FAMILY MEDICINE

## 2022-02-07 RX ORDER — DEXTROAMPHETAMINE SACCHARATE, AMPHETAMINE ASPARTATE, DEXTROAMPHETAMINE SULFATE AND AMPHETAMINE SULFATE 3.75; 3.75; 3.75; 3.75 MG/1; MG/1; MG/1; MG/1
30 TABLET ORAL DAILY
COMMUNITY
Start: 2022-01-31 | End: 2022-09-14

## 2022-02-07 ASSESSMENT — ENCOUNTER SYMPTOMS
VOMITING: 0
BACK PAIN: 1
NAUSEA: 0

## 2022-02-07 NOTE — PATIENT INSTRUCTIONS
Patient Education        Kegel Exercises: Care Instructions  Overview     Kegel exercises strengthen muscles around the bladder. These muscles control the flow of urine. Kegel exercises are sometime called \"pelvic floor\" exercises. They can help prevent urine leakage and keep the pelvic organs in place. Kegel exercises can strengthen pelvic muscles that have been weakened by age, pregnancy, childbirth, and surgery. They may help prevent or treat urine leakage. You do Kegel exercises by tightening the muscles you use when you urinate. You will likely need to do these exercises for several weeks to get better. Follow-up care is a key part of your treatment and safety. Be sure to make and go to all appointments, and call your doctor if you are having problems. It's also a good idea to know your test results and keep a list of the medicines you take. How can you care for yourself at home? · Do Kegel exercises. ? Find the muscles you need to strengthen. To do this, tighten the muscles that stop your urine while you are going to the bathroom. These are the same muscles you squeeze during Kegel exercises. ? Squeeze the muscles as hard as you can. Your belly and thighs should not move. ? Hold the squeeze for 3 seconds. Then relax for 3 seconds. ? Start with 3 seconds, and then add 1 second each week until you are able to squeeze for 10 seconds. ? Repeat the exercise 10 to 15 times for each session. Do three or more sessions each day. · You can check to see if you are using the right muscles. ? Tighten the muscles that help you stop passing gas or keep you from urinating. Make sure you aren't using your stomach, leg, or buttock muscles. ? Place a finger in your vagina and squeeze around it. You are doing them right when you feel pressure around your finger. Your doctor may also suggest that you put special weights in your vagina while you do the exercises.   · Check with your doctor if you don't notice a difference after trying these exercises for several weeks. Your doctor may suggest getting help from a physical therapist or recommend other treatment. Where can you learn more? Go to https://[employer] Explorer[employer]emanojeweb.[email or web address]. org and sign in to your [employer] account. Enter G732 in the [employer] box to learn more about \"Kegel Exercises: Care Instructions. \"     If you do not have an account, please click on the \"Sign Up Now\" link. Current as of: February 10, 2021               Content Version: 13.1  © 6059-7964 Healthwise, Incorporated. Care instructions adapted under license by [facility] ([facility]). If you have questions about a medical condition or this instruction, always ask your healthcare professional. [address] any warranty or liability for your use of this information.

## 2022-02-07 NOTE — PROGRESS NOTES
[address] PRIMARY CARE  [address]   [address]  [address] [address]  Dept: [phone]  Dept Fax: [phone]    Sandhya Jo is a 24 y.o. adult who presents today for [address] conditions/complaints as noted below. Sandhya Jo is c/o of  Chief Complaint   Patient presents with    Annual Exam     3mo f/u, yearly labs & more, discuss myasthina gravis    Referral - General     Podiatry, big toe left foot, pain on inside of arch & under toe, feels like she is stepping on bone,XR in normal         HPI:     HPI     Pt here today for 3 month follow     Hx of anxiety and depression/PTSD, following with psychiatry. Pt mom and pt would like some labs ordered given family hx of autoimmune disorders. Pt does note muscle issues/weakness/ muscle aches. Pt maternal grandmother has myasthenia gravis. Pt's mom has been tested for myasthenia and was negative. Pt maternal great aunt had lupus. Pt sister has hashimotos. Would like new podiatrist wants second opinion- feels like there may be a shift in left foot/big toe that is causing her a lot of pain. States was told plantar fascitis may be causing her pain but she would like another opinion. The pt has  chronic back/hip/neck pain. Has TMJ pain BL as well. pt tried PT for back pain. Hx of vulvodynia- told to do pelvic floor exercises by previous doctor. Hemoglobin A1C (%)   Date Value   06/13/2021 4.8             ( goal A1C is < 7)   No results found for: LABMICR  LDL Cholesterol (mg/dL)   Date Value   06/13/2021 77       (goal LDL is <100)   AST (U/L)   Date Value   06/13/2021 13     ALT (U/L)   Date Value   06/13/2021 10     BP Readings from Last 3 Encounters:   02/07/22 116/76   01/03/22 102/69   12/28/21 104/68          (goal 120/80)    Past Medical History:   Diagnosis Date    Acid reflux     occas    Autism     Autism     Back pain 2021    left shoulder/ back pain.   Physical therapy    Insomnia     Anxiety    PTSD (post-traumatic stress disorder)       Past Surgical History:   Procedure Laterality Date    COLONOSCOPY      CYST REMOVAL Right     2012    FINGER NAIL SURGERY Bilateral 5/7/2021    BILATERAL HALLUX PNA - PHENOL performed by Nevin Krishnan DPM at [address] Bilateral 2020    in grown toe nail    FRENULECTOMY  2012    UPPER GASTROINTESTINAL ENDOSCOPY         Family History   Problem Relation Age of Onset    Graves Disease Mother        Social History     Tobacco Use    Smoking status: Never Smoker    Smokeless tobacco: Never Used   Substance Use Topics    Alcohol use: Never      Current Outpatient Medications   Medication Sig Dispense Refill    amphetamine-dextroamphetamine (ADDERALL) 15 MG tablet 30 mg daily.  zolpidem (AMBIEN) 10 MG tablet take 1 tablet by mouth at bedtime if needed for insomnia NOT TO B...  (REFER TO PRESCRIPTION NOTES).  ondansetron (ZOFRAN ODT) 8 MG TBDP disintegrating tablet Place 1 tablet under the tongue every 12 hours as needed for Nausea or Vomiting 30 tablet 2    diclofenac sodium (VOLTAREN) 1 % GEL Apply 4 g topically 2 times daily 4 g 2    Norgestim-Eth Estrad Triphasic (TRI-SPRINTEC) 0.18/0.215/0.25 MG-35 MCG TABS Take 1 tablet by mouth daily 84 tablet 2    topiramate (TOPAMAX) 200 MG tablet Take 200 mg by mouth nightly (Patient not taking: Reported on 2/7/2022)      QUEtiapine (SEROQUEL) 50 MG tablet take 1 tablet by mouth once daily at bedtime for 1 dose (Patient not taking: Reported on 2/7/2022)      QUEtiapine (SEROQUEL) 100 MG tablet 600mg at night, 200mg in the am (Patient not taking: Reported on 2/7/2022)       No current facility-administered medications for this visit.      No Known Allergies    Health Maintenance   Topic Date Due    Hepatitis C screen  Never done    DTaP/Tdap/Td vaccine (6 - Tdap) 07/31/2011    Depression Screen  Never done    HIV screen  Never done    Chlamydia screen  Never done    Pap smear  Never done    Hepatitis A vaccine  Completed    Hepatitis B vaccine  Completed    Hib vaccine  Completed    HPV vaccine  Completed    Varicella vaccine  Completed    Meningococcal (ACWY) vaccine  Completed    Flu vaccine  Completed    COVID-19 Vaccine  Completed    Pneumococcal 0-64 years Vaccine  Aged Out       Subjective:      Review of Systems   Constitutional: Positive for fatigue. Negative for chills and fever. Cardiovascular: Negative for chest pain. Gastrointestinal: Negative for nausea and vomiting. Musculoskeletal: Positive for back pain and myalgias. Psychiatric/Behavioral: Positive for dysphoric mood. The patient is nervous/anxious. Objective:     Physical Exam  Constitutional:       General: Nehemias Carrasco is not in acute distress. Appearance: Nehemias Carrasco is well-developed. Nehemias Carrasco is not diaphoretic. HENT:      Head: Normocephalic and atraumatic. Eyes:      Pupils: Pupils are equal, round, and reactive to light. Cardiovascular:      Rate and Rhythm: Normal rate and regular rhythm. Heart sounds: Normal heart sounds. No murmur heard. Pulmonary:      Effort: Pulmonary effort is normal. No respiratory distress. Breath sounds: Normal breath sounds. No stridor. Musculoskeletal:      Cervical back: Neck supple. Comments: ttp bl tmj   Skin:     General: Skin is warm and dry. Neurological:      Mental Status: Nehemias Carrasco is alert and oriented to person, place, and time. Psychiatric:         Behavior: Behavior normal.       /76   Pulse 133   Wt 149 lb 12.8 oz (67.9 kg)   SpO2 98%   BMI 21.49 kg/m²     Assessment:      1. Left foot pain  - new referral provided. - [place], [place], [address] [place] [address]    2. Family history of myasthenia gravis  - Acetylcholine Receptor Ab; Future    3. Family history of Graves' disease  - T4, Free; Future  - TSH;  Future  - T3, Free; Future  - Anti-Thyroglobulin Antibody; Future  - Thyroglobulin; Future    4. Other fatigue  - T4, Free; Future  - TSH; Future  - T3, Free; Future  - Anti-Thyroglobulin Antibody; Future  - Thyroglobulin; Future  - Acetylcholine Receptor Ab; Future  - ESTEPHANIA Screen With Reflex; Future    5. Muscle ache  - did recommend pt would benefit from massage therapy for her chronic back/neck pain. Pt mom will check with insurance for locations  - ESTEPHANIA Screen With Reflex; Future    6. Muscle weakness  - ESTEPHANIA Screen With Reflex; Future    7. Autism spectrum disorder    8. Anxiety  - notes more stress lately/anxiety. - pt mom notes grandparent will be moving back and will have more family around. - following with psychiatry. 9. Adjustment disorder with depressed mood  - following with psychiatry. Plan:      Return in about 3 months (around 5/7/2022) for follow up. Orders Placed This Encounter   Procedures    T4, Free     Standing Status:   Future     Standing Expiration Date:   2/7/2023    TSH     Standing Status:   Future     Standing Expiration Date:   2/7/2023    T3, Free     Standing Status:   Future     Standing Expiration Date:   2/7/2023    Anti-Thyroglobulin Antibody     Standing Status:   Future     Standing Expiration Date:   2/7/2023    Thyroglobulin     Standing Status:   Future     Standing Expiration Date:   2/7/2023    Acetylcholine Receptor Ab     Standing Status:   Future     Standing Expiration Date:   2/7/2023    ESTEPHANIA Screen With Reflex     Standing Status:   Future     Standing Expiration Date:   2/7/2023    HUSAM - Amanda Cervantes DPM, Podiatry, [place]     Referral Priority:   Routine     Referral Type:   Eval and Treat     Referral Reason:   Specialty Services Required     Referred to Provider:   Prabhakar Pardo DPM     Requested Specialty:   Podiatry     Number of Visits Requested:   1     No orders of the defined types were placed in this encounter. Patient given educational materials - see patient instructions. Discussed use, benefit, and side effects of prescribed medications. All patient questions answered. Pt voiced understanding. Reviewed healthmaintenance. Instructed to continue current medications, diet and exercise. Patient agreed with treatment plan. Follow up as directed.      Electronically signed by Erin Sebastian DO on 2/7/2022 at 2:36 PM

## 2022-02-20 DIAGNOSIS — M25.50 ARTHRALGIA, UNSPECIFIED JOINT: Primary | ICD-10-CM

## 2022-03-31 ENCOUNTER — PATIENT MESSAGE (OUTPATIENT)
Dept: OBGYN CLINIC | Age: 22
End: 2022-03-31

## 2022-03-31 RX ORDER — FLUCONAZOLE 150 MG/1
150 TABLET ORAL ONCE
Qty: 1 TABLET | Refills: 0 | Status: SHIPPED | OUTPATIENT
Start: 2022-03-31 | End: 2022-04-04 | Stop reason: SDUPTHER

## 2022-03-31 NOTE — TELEPHONE ENCOUNTER
From: Kris Barajas  To: Dr. Malena Miranda: 3/31/2022 8:18 AM EDT  Subject: Need Diflucan Refilled    Hi, this is Latoya's mom, Sandip Trujillo. She's having one of her external flare-ups in which a couple days of Diflucan helped last time. Because of her Vulvodynia and not being able to use creams Diflucan was prescribed for her last time. Hoping to be able to get this picked up today so she can take it as soon as possible. I'm her legal guardian and the court order is in her records, so you can call me if you need to. [facility] in [place] is where we  her scripts. My number [phone]    Thank you!     Pipo Mascot (Mom)

## 2022-04-04 RX ORDER — FLUCONAZOLE 150 MG/1
150 TABLET ORAL ONCE
Qty: 1 TABLET | Refills: 0 | Status: SHIPPED | OUTPATIENT
Start: 2022-04-04 | End: 2022-04-04

## 2022-05-10 NOTE — PROGRESS NOTES
[address] [facility] [address] [address] [facility]  Dept: [phone]    RETURN PATIENT PROGRESS NOTE  Date of patient's visit: 8/26/2021  Patient's Name:  Melissa Rasheed YOB: 2000            Patient Care Team:  [facility], DO as PCP - General (Family Medicine)  [facility], DO as PCP - [facility] Empaneled Provider       Melissa Rasheed 24 y.o. female that presents for follow-up of   Chief Complaint   Patient presents with    Toe Pain     [facility]  Symptoms began 2 month(s) ago and are increased . Patient relates pain is Present. Pain is rated 1 out of 10 and is described as intermittent. Treatments prior to today's visit include: removal of ingrown toenails bilateral great toes. Currently denies F/C/N/V. Patient states it feels like she is getting any ingrown toenail again even though the toenails have been removed. No Known Allergies    Past Medical History:   Diagnosis Date    Acid reflux     occas    Autism     Autism     Back pain 2021    left shoulder/ back pain. Physical therapy    Insomnia     Anxiety    PTSD (post-traumatic stress disorder)        Prior to Admission medications    Medication Sig Start Date End Date Taking?  Authorizing Provider   Norgestim-Eth Estrad Triphasic (TRI-SPRINTEC) 0.18/0.215/0.25 MG-35 MCG TABS Take 1 tablet by mouth daily 8/12/21  Yes Rehana Medhkour, DO   topiramate (TOPAMAX) 200 MG tablet Take 200 mg by mouth nightly 7/27/21  Yes Historical Provider, MD   naproxen (NAPROSYN) 500 MG tablet Take 1 tablet by mouth 2 times daily (with meals) As needed for pain 7/15/21  Yes Rehana Medhkour, DO   ondansetron (ZOFRAN ODT) 8 MG TBDP disintegrating tablet Place 1 tablet under the tongue every 12 hours as needed for Nausea or Vomiting 7/15/21  Yes Rehana Medhkour, DO   QUEtiapine (SEROQUEL) 50 MG tablet take 1 tablet by mouth once daily at bedtime for 1 dose 4/20/21  Yes Historical Provider, MD   QUEtiapine (SEROQUEL) 100 MG tablet 600mg at night, 200mg in the am 4/20/21  Yes Historical Provider, MD       Review of Systems    Review of Systems:  History obtained from chart review and the patient  General ROS: negative for - chills, fatigue, fever, night sweats or weight gain  Constitutional: Negative for chills, diaphoresis, fatigue, fever and unexpected weight change. Musculoskeletal: Positive for arthralgias, gait problem and joint swelling. Neurological ROS: negative for - behavioral changes, confusion, headaches or seizures. Negative for weakness and numbness. Dermatological ROS: negative for - mole changes, rash  Cardiovascular: Negative for leg swelling. Gastrointestinal: Negative for constipation, diarrhea, nausea and vomiting. Lower Extremity Physical Examination:     Vitals: There were no vitals filed for this visit. General: AAO x 3 in NAD. Dermatologic Exam:  Nail is absent to jomar hallux without edema, erythema. Musculoskeletal:     1st MPJ ROM decreased, Bilateral.  Muscle strength 5/5, Bilateral.  Pain present upon palpation of jomar hallux. Medial longitudinal arch, Bilateral WNL.   Ankle ROM WNL,Bilateral.    Dorsally contracted digits absent digits 1-5 Bilateral.     Vascular: DP and PT pulses palpable 2/4, Bilateral.  CFT <3 seconds, Bilateral.  Hair growth present to the level of the digits, Bilateral.  Edema absent, Bilateral.  Varicosities absent, Bilateral. Erythema absent, Bilateral    Neurological: Sensation intact to light touch to level of digits, Bilateral.  Protective sensation intact 10/10 sites via 5.07/10g [place]-Dom Monofilament, Bilateral.  negative Tinel's, Bilateral.  negative Valleix sign, Bilateral.      Integument: Warm, dry, supple, Bilateral.  Open lesion absent, Bilateral.  Interdigital maceration absent to web spaces 1-4, Bilateral.  Nails are normal in length, thickness and color 2-5 bilateral.  Fissures absent, Bilateral.       Asessment: Patient is a 24 y.o. female with:    Diagnosis Orders   1. Pain in toes of both feet           Plan: Patient examined and evaluated. Current condition and treatment options discussed in detail. Advised pt to closely monitor for infection. At this time there is no evidence of nail growth. All labs were reviewed and all imagining including the above findings were reviewed PRIOR to the patients arrival and with the patient today. Previous patient encounter was reviewed. Encounters from the patients other medical providers were reviewed and noted. Time was spent educating the patient and their families/caregivers on proper care of the feet and ankles. All the above diagnosis were addressed at todays visit and all questions were answered. A total of 20 minutes was spent with this patients encounter which included charting after the patients visit    . Verbal and written instructions given to patient. Contact office with any questions/problems/concerns. No orders of the defined types were placed in this encounter. No orders of the defined types were placed in this encounter.        RTC in PRN  8/26/2021      Electronically signed by Riley Hernandez DPM on 8/26/2021 at 11:32 AM  8/26/2021 30 yo woman with antiphospholipid syndrome, just post partum presents with epigastric pain radiating through to back intermittent over the past 2 days.  Workup in ED was ok including CT scan chest/abd/pelvis, other than potential retained products.  But patient has no major discharge, fever or lower pain.  OB cleared.  Possibly GERD.  Stable for discharge and outpatient follow up.

## 2022-06-30 ENCOUNTER — OFFICE VISIT (OUTPATIENT)
Dept: FAMILY MEDICINE CLINIC | Age: 22
End: 2022-06-30
Payer: COMMERCIAL

## 2022-06-30 VITALS
DIASTOLIC BLOOD PRESSURE: 74 MMHG | SYSTOLIC BLOOD PRESSURE: 104 MMHG | TEMPERATURE: 99.1 F | RESPIRATION RATE: 20 BRPM | HEART RATE: 85 BPM | OXYGEN SATURATION: 97 %

## 2022-06-30 DIAGNOSIS — R50.9 FEVER, UNSPECIFIED FEVER CAUSE: ICD-10-CM

## 2022-06-30 DIAGNOSIS — H66.003 NON-RECURRENT ACUTE SUPPURATIVE OTITIS MEDIA OF BOTH EARS WITHOUT SPONTANEOUS RUPTURE OF TYMPANIC MEMBRANES: Primary | ICD-10-CM

## 2022-06-30 DIAGNOSIS — Z11.52 ENCOUNTER FOR SCREENING FOR COVID-19: ICD-10-CM

## 2022-06-30 DIAGNOSIS — R05.9 COUGH: ICD-10-CM

## 2022-06-30 LAB
Lab: NORMAL
QC PASS/FAIL: NORMAL
SARS-COV-2 RDRP RESP QL NAA+PROBE: NEGATIVE

## 2022-06-30 PROCEDURE — 99213 OFFICE O/P EST LOW 20 MIN: CPT | Performed by: NURSE PRACTITIONER

## 2022-06-30 PROCEDURE — G8427 DOCREV CUR MEDS BY ELIG CLIN: HCPCS | Performed by: NURSE PRACTITIONER

## 2022-06-30 PROCEDURE — 1036F TOBACCO NON-USER: CPT | Performed by: NURSE PRACTITIONER

## 2022-06-30 PROCEDURE — G8420 CALC BMI NORM PARAMETERS: HCPCS | Performed by: NURSE PRACTITIONER

## 2022-06-30 PROCEDURE — 87635 SARS-COV-2 COVID-19 AMP PRB: CPT | Performed by: NURSE PRACTITIONER

## 2022-06-30 RX ORDER — AMOXICILLIN 400 MG/5ML
800 POWDER, FOR SUSPENSION ORAL 2 TIMES DAILY
Qty: 200 ML | Refills: 0 | Status: SHIPPED | OUTPATIENT
Start: 2022-06-30 | End: 2022-07-10

## 2022-06-30 ASSESSMENT — PATIENT HEALTH QUESTIONNAIRE - PHQ9
2. FEELING DOWN, DEPRESSED OR HOPELESS: 0
SUM OF ALL RESPONSES TO PHQ QUESTIONS 1-9: 0
SUM OF ALL RESPONSES TO PHQ9 QUESTIONS 1 & 2: 0
SUM OF ALL RESPONSES TO PHQ QUESTIONS 1-9: 0
1. LITTLE INTEREST OR PLEASURE IN DOING THINGS: 0

## 2022-06-30 ASSESSMENT — ENCOUNTER SYMPTOMS
SHORTNESS OF BREATH: 0
VOMITING: 0
CHEST TIGHTNESS: 0
SORE THROAT: 0
NAUSEA: 0
RHINORRHEA: 0
COUGH: 1
EYE PAIN: 0

## 2022-06-30 NOTE — PROGRESS NOTES
[address] [facility]  [address] [address]  Dept: [phone]  Dept Fax: [phone]    Simón Gaytan is a 24 y.o. adult who presents today for [address] conditions/complaints of   Chief Complaint   Patient presents with    Sinus Problem     blood when blowing nose.  Nasal Congestion    Cough     hurts to cough    Generalized Body Aches     has body aches all over per patient. BP cuff was extremely painful.  Sweats     \"feels hot/sweaty\" - onset last week per patient/ mom unable to verify           HPI:     /74 (Site: Right Upper Arm, Position: Sitting, Cuff Size: Medium Adult)   Pulse 85   Temp 99.1 °F (37.3 °C) (Temporal)   Resp 20   SpO2 97%       HPI  Pt presented to the clinic today with c/o congestion. This is a new problem. The current episode started 2 weeks ago. Started improving then over the last 3 days worsened. Associated symptoms include: sinus pain/presssure, headache, body aches, dry cough, chills, ear pain. Pertinent negatives include: No fever, SOB, chest pain, abdominal pain. Pt has tried nothing with no improvement. Vaccinated for COVID:  YES  Exposure to COVID:  NO    Past Medical History:   Diagnosis Date    Acid reflux     occas    Autism     Autism     Back pain 2021    left shoulder/ back pain.   Physical therapy    Insomnia     Anxiety    PTSD (post-traumatic stress disorder)         Past Surgical History:   Procedure Laterality Date    COLONOSCOPY      CYST REMOVAL Right     2012    FINGER NAIL SURGERY Bilateral 5/7/2021    BILATERAL HALLUX PNA - PHENOL performed by Zeenat Babb DPM at [facility] Bilateral 2020    in grown toe nail    FRENULECTOMY  2012    UPPER GASTROINTESTINAL ENDOSCOPY         Family History   Problem Relation Age of Onset    Graves Disease Mother        Social History     Tobacco Use    Smoking status: Never Smoker    Smokeless tobacco: Never Used   Substance Use Topics    Alcohol use: Never        Prior to Visit Medications    Medication Sig Taking? Authorizing Provider   amoxicillin (AMOXIL) 400 MG/5ML suspension Take 10 mLs by mouth 2 times daily for 10 days Yes Natalee CARLOS Steven CNP   amphetamine-dextroamphetamine (ADDERALL) 15 MG tablet 30 mg daily. Historical Provider, MD   zolpidem (AMBIEN) 10 MG tablet take 1 tablet by mouth at bedtime if needed for insomnia NOT TO B...  (REFER TO PRESCRIPTION NOTES). Historical Provider, MD   ondansetron (ZOFRAN ODT) 8 MG TBDP disintegrating tablet Place 1 tablet under the tongue every 12 hours as needed for Nausea or Vomiting  Rehana Medhkour, DO   diclofenac sodium (VOLTAREN) 1 % GEL Apply 4 g topically 2 times daily  Dian Floss, DPM   Norgestim-Eth Estrad Triphasic (TRI-SPRINTEC) 0.18/0.215/0.25 MG-35 MCG TABS Take 1 tablet by mouth daily  Rehana Medhkour, DO   topiramate (TOPAMAX) 200 MG tablet Take 200 mg by mouth nightly  Patient not taking: Reported on 2/7/2022  Historical Provider, MD   QUEtiapine (SEROQUEL) 50 MG tablet take 1 tablet by mouth once daily at bedtime for 1 dose  Patient not taking: Reported on 2/7/2022  Historical Provider, MD   QUEtiapine (SEROQUEL) 100 MG tablet 600mg at night, 200mg in the am  Patient not taking: Reported on 2/7/2022  Historical Provider, MD       No Known Allergies      Subjective:      Review of Systems   Constitutional: Positive for chills and fatigue. Negative for fever. HENT: Positive for congestion, ear pain and postnasal drip. Negative for rhinorrhea and sore throat. Eyes: Negative for pain and visual disturbance. Respiratory: Positive for cough. Negative for chest tightness and shortness of breath. Cardiovascular: Negative for chest pain, palpitations and leg swelling. Gastrointestinal: Negative for nausea and vomiting.    Genitourinary: Negative for decreased urine volume and difficulty urinating. Musculoskeletal: Positive for arthralgias and myalgias. Negative for gait problem and neck pain. Skin: Negative for pallor and rash. Neurological: Positive for headaches. Negative for weakness and light-headedness. Psychiatric/Behavioral: Negative for sleep disturbance. Objective:     Physical Exam  Vitals and nursing note reviewed. Constitutional:       General: Simón Gaytan is not in acute distress. Appearance: Normal appearance. HENT:      Head: Normocephalic and atraumatic. Jaw: No trismus. Right Ear: Ear canal normal. Tympanic membrane is erythematous. Left Ear: Ear canal normal. Tympanic membrane is erythematous. Nose: Congestion present. Mouth/Throat:      Lips: Pink. Mouth: Mucous membranes are moist.      Pharynx: Oropharynx is clear. Uvula midline. No oropharyngeal exudate. Eyes:      Extraocular Movements: Extraocular movements intact. Conjunctiva/sclera: Conjunctivae normal.   Cardiovascular:      Rate and Rhythm: Normal rate and regular rhythm. Pulses: Normal pulses. Pulmonary:      Effort: Pulmonary effort is normal. No tachypnea. Breath sounds: Normal breath sounds. Abdominal:      General: Bowel sounds are normal.      Palpations: Abdomen is soft. Musculoskeletal:         General: Normal range of motion. Cervical back: Normal range of motion and neck supple. Skin:     General: Skin is warm and dry. Capillary Refill: Capillary refill takes less than 2 seconds. Coloration: Skin is not pale. Neurological:      Mental Status: Simón Gaytan is alert and oriented to person, place, and time. Coordination: Coordination normal.      Gait: Gait normal.   Psychiatric:         Mood and Affect: Mood normal.         Thought Content:  Thought content normal.           MEDICAL DECISION MAKING Assessment/Plan:     Ella Conner was seen today for sinus problem, nasal congestion, cough, generalized body aches and sweats. Diagnoses and all orders for this visit:    Non-recurrent acute suppurative otitis media of both ears without spontaneous rupture of tympanic membranes  -     amoxicillin (AMOXIL) 400 MG/5ML suspension; Take 10 mLs by mouth 2 times daily for 10 days    Fever, unspecified fever cause  -     POCT COVID-19 Rapid, NAAT    Cough  -     POCT COVID-19 Rapid, NAAT    Encounter for screening for COVID-19  -     POCT COVID-19 Rapid, NAAT        Results for orders placed or performed in visit on 06/30/22   POCT COVID-19 Rapid, NAAT   Result Value Ref Range    SARS-COV-2, RdRp gene Negative Negative    Lot Number [id number]     QC Pass/Fail pass      COVID-19 testing in the office was negative. Based on patient's history and exam findings, I will treat this as an otitis media. Patient instructed to complete antibiotic prescription fully. Increase fluids. May use Motrin/Tylenol for fever/pain as directed on the bottle. Warm compresses as desired for ear pain. Follow up if symptoms do not improve. Go to the ER for any emergent concern. Preventing the Spread of Coronavirus Disease 2019 in Homes and Residential Communities: For the most recent information go to: [employer]    Patient given educational materials - see patientinstructions. Discussed use, benefit, and side effects of prescribed medications. All patient questions answered. Pt verbalized understanding. Instructed to continue current medications, diet and exercise. Patient agreed with treatment plan. Follow up as directed.      Electronically signed by CARLOS Caicedo CNP on 6/30/2022 at 1:58 PM

## 2022-06-30 NOTE — PATIENT INSTRUCTIONS
Patient Education        Ear Infection (Otitis Media): Care Instructions  Overview     An ear infection may start with a cold and affect the middle ear (otitis media). It can hurt a lot. Most ear infections clear up on their own in a couple of days and do not need antibiotics. Also, antibiotics do not work against viruses, which may be the cause of your infection. Regular doses ofpain relievers are the best way to reduce your fever and help you feel better. Follow-up care is a key part of your treatment and safety. Be sure to make and go to all appointments, and call your doctor if you are having problems. It's also a good idea to know your test results and keep alist of the medicines you take. How can you care for yourself at home?  Take pain medicines exactly as directed. ? If the doctor gave you a prescription medicine for pain, take it as prescribed. ? If you are not taking a prescription pain medicine, take an over-the-counter medicine, such as acetaminophen (Tylenol), ibuprofen (Advil, Motrin), or naproxen (Aleve). Read and follow all instructions on the label. ? Do not take two or more pain medicines at the same time unless the doctor told you to. Many pain medicines have acetaminophen, which is Tylenol. Too much acetaminophen (Tylenol) can be harmful.  Plan to take a full dose of pain reliever before bedtime. Getting enough sleep will help you get better.  Try a warm, moist washcloth on the ear. It may help relieve pain.  If your doctor prescribed antibiotics, take them as directed. Do not stop taking them just because you feel better. You need to take the full course of antibiotics. When should you call for help? Call your doctor now or seek immediate medical care if:     You have new or increasing ear pain.      You have new or increasing pus or blood draining from your ear.      You have a fever with a stiff neck or a severe headache.    Watch closely for changes in your health, and be sure to contact your doctor if:     You have new or worse symptoms.      You are not getting better after taking an antibiotic for 2 days. Where can you learn more? Go to [email or web address] and sign in to your [employer] account. Enter X380 in the [facility] box to learn more about \"Ear Infection (Otitis Media): Care Instructions. \"     If you do not have an account, please click on the \"Sign Up Now\" link. Current as of: September 8, 2021               Content Version: 13.3  © 7057-6489 [employer]. Care instructions adapted under license by [facility] ([facility]). If you have questions about a medical condition or this instruction, always ask your healthcare professional. [address] any warranty or liability for your use of this information.

## 2022-07-08 DIAGNOSIS — H66.003 NON-RECURRENT ACUTE SUPPURATIVE OTITIS MEDIA OF BOTH EARS WITHOUT SPONTANEOUS RUPTURE OF TYMPANIC MEMBRANES: Primary | ICD-10-CM

## 2022-07-08 RX ORDER — AMOXICILLIN AND CLAVULANATE POTASSIUM 250; 62.5 MG/5ML; MG/5ML
500 POWDER, FOR SUSPENSION ORAL 3 TIMES DAILY
Qty: 210 ML | Refills: 0 | Status: SHIPPED | OUTPATIENT
Start: 2022-07-08 | End: 2022-07-15

## 2022-08-15 DIAGNOSIS — N94.819 VULVODYNIA: Primary | ICD-10-CM

## 2022-08-15 RX ORDER — FLUCONAZOLE 150 MG/1
150 TABLET ORAL DAILY
Qty: 2 TABLET | Refills: 2 | Status: SHIPPED | OUTPATIENT
Start: 2022-08-15 | End: 2022-09-14

## 2022-08-15 NOTE — TELEPHONE ENCOUNTER
Script sent with 2 refills, she will need to have annual appointment in October to continue to get refills throughout year, please notify pt if she wants to schedule it now for OCtober

## 2022-08-15 NOTE — TELEPHONE ENCOUNTER
Patient had seen Dr. Atul Jefferson . Mom phoned office  Needs refill on her Diflucan which was tx for her vulvodynia which works well for her. .  Last refill was 3-31-22  when she was also seen in office. Usually takes 2 doses . Script pended.

## 2022-08-30 RX ORDER — NORGESTIMATE AND ETHINYL ESTRADIOL 7DAYSX3 28
1 KIT ORAL DAILY
Qty: 28 TABLET | Refills: 1 | Status: SHIPPED | OUTPATIENT
Start: 2022-08-30 | End: 2022-09-14 | Stop reason: SDUPTHER

## 2022-09-14 ENCOUNTER — OFFICE VISIT (OUTPATIENT)
Dept: OBGYN CLINIC | Age: 22
End: 2022-09-14
Payer: COMMERCIAL

## 2022-09-14 VITALS
HEIGHT: 70 IN | DIASTOLIC BLOOD PRESSURE: 70 MMHG | WEIGHT: 165 LBS | BODY MASS INDEX: 23.62 KG/M2 | SYSTOLIC BLOOD PRESSURE: 120 MMHG

## 2022-09-14 DIAGNOSIS — N94.6 DYSMENORRHEA: Primary | ICD-10-CM

## 2022-09-14 DIAGNOSIS — N94.819 VULVODYNIA: ICD-10-CM

## 2022-09-14 PROBLEM — F84.0 AUTISTIC DISORDER OF CHILDHOOD ONSET: Status: RESOLVED | Noted: 2018-02-06 | Resolved: 2022-09-14

## 2022-09-14 PROCEDURE — 99213 OFFICE O/P EST LOW 20 MIN: CPT | Performed by: ADVANCED PRACTICE MIDWIFE

## 2022-09-14 PROCEDURE — 1036F TOBACCO NON-USER: CPT | Performed by: ADVANCED PRACTICE MIDWIFE

## 2022-09-14 PROCEDURE — G8427 DOCREV CUR MEDS BY ELIG CLIN: HCPCS | Performed by: ADVANCED PRACTICE MIDWIFE

## 2022-09-14 PROCEDURE — G8420 CALC BMI NORM PARAMETERS: HCPCS | Performed by: ADVANCED PRACTICE MIDWIFE

## 2022-09-14 RX ORDER — FLUCONAZOLE 150 MG/1
150 TABLET ORAL ONCE
Qty: 2 TABLET | Refills: 0 | Status: SHIPPED | OUTPATIENT
Start: 2022-09-14 | End: 2022-09-14

## 2022-09-14 RX ORDER — NORGESTIMATE AND ETHINYL ESTRADIOL 7DAYSX3 28
1 KIT ORAL DAILY
Qty: 28 TABLET | Refills: 11 | Status: SHIPPED | OUTPATIENT
Start: 2022-09-14

## 2022-09-14 ASSESSMENT — ENCOUNTER SYMPTOMS
ABDOMINAL PAIN: 0
VOMITING: 0
SHORTNESS OF BREATH: 0
NAUSEA: 0
DIARRHEA: 0

## 2022-09-14 NOTE — PROGRESS NOTES
[address] [facility]  [place]  [address] [address]  Dept: [phone]    Patient Name: Vel Cao  Patient Age: 25 y.o. Date of Visit: 2022    Subjective  Chief Complaint   Patient presents with    Gynecologic Exam     No pap      Patient's last menstrual period was 2022 (approximate). HPI  Arrives with Mother (legal guardian) for birth control refills. Kesha is moving and would like refill on birth control until they establish care where they are moving. Reports is not sexually active but has been with men many years ago. Has had negative STD screens. Reports having been diagnosed with vulvodynia. She reports there is a personal history or family history of:    Smoking (> 15 cigs/day): No    Migraine with Aura:  No    HTN (> 160/100): No    DVT:  No    Thrombophilias:  No    Stroke (CVA): No     Ischemic heart disease:  No    Valvular heart disease (A Fib, Pul HTN, etc): No    Positive Antiphospholipid Abs:  No    Liver Disease:  No      PHQ Scores 2022   PHQ2 Score 0 0   PHQ9 Score 0 0     Interpretation of Total Score Depression Severity: 1-4 = Minimal depression, 5-9 = Mild depression, 10-14 = Moderate depression, 15-19 = Moderately severe depression, 20-27 = Severe depression      OB History          0    Para   0    Term   0       0    AB   0    Living   0         SAB   0    IAB   0    Ectopic   0    Molar   0    Multiple   0    Live Births   0              Past Medical History:   Diagnosis Date    Autism     Back pain     left shoulder/ back pain.   Physical therapy    Insomnia     Anxiety    PTSD (post-traumatic stress disorder)      Current Outpatient Medications   Medication Sig Dispense Refill    Norgestim-Eth Estrad Triphasic (TRI-SPRINTEC) 0.18/0.215/0.25 MG-35 MCG TABS Take 1 tablet by mouth daily 28 tablet 11    fluconazole (DIFLUCAN) 150 MG tablet Take 1 tablet by mouth once for 1 dose 2 tablet 0    ondansetron (ZOFRAN ODT) 8 MG TBDP disintegrating tablet Place 1 tablet under the tongue every 12 hours as needed for Nausea or Vomiting 30 tablet 2    diclofenac sodium (VOLTAREN) 1 % GEL Apply 4 g topically 2 times daily 4 g 2     No current facility-administered medications for this visit. Review of Systems   Constitutional:  Negative for unexpected weight change. Respiratory:  Negative for shortness of breath. Cardiovascular:  Negative for chest pain, palpitations and leg swelling. Gastrointestinal:  Negative for abdominal pain, diarrhea, nausea and vomiting. Genitourinary:  Positive for menstrual problem and pelvic pain. Negative for difficulty urinating, dyspareunia, vaginal discharge and vaginal pain. Neurological:  Negative for dizziness, light-headedness and headaches. Objective  /70 (Site: Right Upper Arm, Position: Sitting, Cuff Size: Medium Adult)   Ht 5' 10\" (1.778 m)   Wt 165 lb (74.8 kg)   LMP 08/29/2022 (Approximate)   BMI 23.68 kg/m²     Physical Exam  Vitals reviewed. Constitutional:       General: Juanis Julien is not in acute distress. Appearance: Juanis Julien is well-developed. Juanis Julien is not diaphoretic. Neck:      Thyroid: No thyromegaly or thyroid tenderness. Cardiovascular:      Rate and Rhythm: Normal rate and regular rhythm. Heart sounds: Normal heart sounds. Pulmonary:      Effort: Pulmonary effort is normal. No respiratory distress. Breath sounds: Normal breath sounds. Abdominal:      General: There is no distension. Palpations: Abdomen is soft. Tenderness: There is no abdominal tenderness. Musculoskeletal:         General: Normal range of motion. Cervical back: Normal range of motion. Skin:     General: Skin is warm and dry. Neurological:      Mental Status: Juanis Julien is alert and oriented to person, place, and time. Mental status is at baseline. Psychiatric:         Behavior: Behavior normal.         Thought Content: Thought content normal.         Judgment: Judgment normal.         Assessment & Plan  1. Dysmenorrhea  Patient was educated to notify office and seek medical care if abdominal pain, chest pain, severe headaches, eye problems/loss of vision, or severe leg pain or swelling in the calf occurs (ACHES). - Norgestim-Eth Estrad Triphasic (TRI-SPRINTEC) 0.18/0.215/0.25 MG-35 MCG TABS; Take 1 tablet by mouth daily  Dispense: 28 tablet; Refill: 11   Reviewed is over due for pap smear. Both acknowledge and will get up to date when they move    2. Vulvodynia  - Reviewed not standard care treatment patient and mother request refill. -  - fluconazole (DIFLUCAN) 150 MG tablet; Take 1 tablet by mouth once for 1 dose  Dispense: 2 tablet; Refill: 0        The patient, Loren Mcgee , was seen with a total time spent of 25 minutes for the visit on this date of service by the [facility]  Both face-to-face (counseling and education) and non face-to-face time (care coordination), were spent in determining the total time component. Return if symptoms worsen or fail to improve.     Electronically Signed CARLOS Chapa CNM

## 2022-11-07 ENCOUNTER — PATIENT MESSAGE (OUTPATIENT)
Dept: OBGYN CLINIC | Age: 22
End: 2022-11-07

## 2022-11-08 RX ORDER — FLUCONAZOLE 150 MG/1
150 TABLET ORAL ONCE
Qty: 1 TABLET | Refills: 2 | Status: SHIPPED | OUTPATIENT
Start: 2022-11-08 | End: 2022-11-08

## 2022-12-14 ENCOUNTER — PATIENT MESSAGE (OUTPATIENT)
Dept: PRIMARY CARE CLINIC | Age: 22
End: 2022-12-14

## 2022-12-14 RX ORDER — ONDANSETRON 8 MG/1
8 TABLET, ORALLY DISINTEGRATING ORAL EVERY 12 HOURS PRN
Qty: 30 TABLET | Refills: 2 | Status: SHIPPED | OUTPATIENT
Start: 2022-12-14

## 2023-01-17 DIAGNOSIS — N94.6 DYSMENORRHEA: ICD-10-CM

## 2023-01-17 RX ORDER — NORGESTIMATE AND ETHINYL ESTRADIOL 7DAYSX3 28
1 KIT ORAL DAILY
Qty: 28 TABLET | Refills: 2 | Status: SHIPPED | OUTPATIENT
Start: 2023-01-17

## 2025-02-03 NOTE — TELEPHONE ENCOUNTER
Patient's mom phoned office for refill on Latoya's OCP's. Patient has appt sched w/ you 9-14 but needs refills just until that appt. Patient is disabled and mom will be bringing her in for an annual- but no Pap needed. Not sexually active. Hx vulvodynia.     Script pended
96

## (undated) DEVICE — Z INACTIVE USE 2653177 SPONGE GZ W2XL2IN NONWOVEN 4 PLY FASTER WICKING ABIL AVANT

## (undated) DEVICE — APPLICATOR MEDICATED 20X2.9 MM COTTON TIP MIC

## (undated) DEVICE — GLOVE SURG SZ 65 CRM LTX FREE POLYISOPRENE POLYMER BEAD ANTI

## (undated) DEVICE — Device

## (undated) DEVICE — BANDAGE COHESIVE NONSTERILE TAN 1INX5YD CARING

## (undated) DEVICE — DRAPE,REIN 53X77,STERILE: Brand: MEDLINE

## (undated) DEVICE — SWABSTICK MEDICATED 10% POVIDONE IOD PVP SGL ANTISEP SAT

## (undated) DEVICE — NEEDLE HYPO 25GA L1.5IN BLU POLYPR HUB S STL REG BVL STR

## (undated) DEVICE — BNDG,ELSTC,MATRIX,STRL,2"X5YD,LF,HOOK&LP: Brand: MEDLINE

## (undated) DEVICE — GAUZE,SPONGE,4"X4",16PLY,XRAY,STRL,LF: Brand: MEDLINE

## (undated) DEVICE — PENROSE DRAIN 18 X .5" SILICONE: Brand: MEDLINE